# Patient Record
Sex: FEMALE | Race: BLACK OR AFRICAN AMERICAN | NOT HISPANIC OR LATINO | Employment: UNEMPLOYED | URBAN - METROPOLITAN AREA
[De-identification: names, ages, dates, MRNs, and addresses within clinical notes are randomized per-mention and may not be internally consistent; named-entity substitution may affect disease eponyms.]

---

## 2017-09-26 LAB
C TRACH RRNA SPEC QL PROBE: NORMAL
HBV SURFACE AG SERPL QL IA: NEGATIVE
HCT VFR BLD AUTO: 34 % (ref 36–46)
HGB BLD-MCNC: 11 G/DL (ref 12–16)
MCV RBC AUTO: 91 FL (ref 82–108)
PLATELET # BLD AUTO: 185 K/ΜL (ref 150–399)
RPR: NORMAL

## 2017-10-11 DIAGNOSIS — Z36.89 ESTABLISH GESTATIONAL AGE, ULTRASOUND: Primary | ICD-10-CM

## 2017-10-11 DIAGNOSIS — O09.522 ELDERLY MULTIGRAVIDA IN SECOND TRIMESTER: ICD-10-CM

## 2017-10-20 ENCOUNTER — OFFICE VISIT (OUTPATIENT)
Dept: MATERNAL FETAL MEDICINE | Facility: CLINIC | Age: 37
End: 2017-10-20
Payer: MEDICAID

## 2017-10-20 VITALS
BODY MASS INDEX: 28.65 KG/M2 | SYSTOLIC BLOOD PRESSURE: 118 MMHG | WEIGHT: 172.19 LBS | DIASTOLIC BLOOD PRESSURE: 80 MMHG

## 2017-10-20 DIAGNOSIS — Z36.89 ESTABLISH GESTATIONAL AGE, ULTRASOUND: ICD-10-CM

## 2017-10-20 DIAGNOSIS — O09.522 ELDERLY MULTIGRAVIDA IN SECOND TRIMESTER: ICD-10-CM

## 2017-10-20 PROCEDURE — 99203 OFFICE O/P NEW LOW 30 MIN: CPT | Mod: S$PBB,TH,, | Performed by: OBSTETRICS & GYNECOLOGY

## 2017-10-20 PROCEDURE — 99999 PR PBB SHADOW E&M-EST. PATIENT-LVL II: CPT | Mod: PBBFAC,,, | Performed by: OBSTETRICS & GYNECOLOGY

## 2017-10-20 PROCEDURE — 76815 OB US LIMITED FETUS(S): CPT | Mod: PBBFAC | Performed by: OBSTETRICS & GYNECOLOGY

## 2017-10-20 PROCEDURE — 99212 OFFICE O/P EST SF 10 MIN: CPT | Mod: PBBFAC,25 | Performed by: OBSTETRICS & GYNECOLOGY

## 2017-10-20 PROCEDURE — 76815 OB US LIMITED FETUS(S): CPT | Mod: 26,S$PBB,, | Performed by: OBSTETRICS & GYNECOLOGY

## 2017-10-20 NOTE — LETTER
October 21, 2017      Moustapha Wolff, APRN  [address] [facility] [address]           [facility] - Maternal Fetal Med  [address] [facility] [address]  Phone: [phone]          Patient: Britt Roldan   MR Number: [id number]   YOB: 1980   Date of Visit: 10/20/2017       Dear Moustapha Wolff:    Thank you for referring Britt Roldan to me for evaluation. Attached you will find relevant portions of my assessment and plan of care.    If you have questions, please do not hesitate to call me. I look forward to following Britt Roldan along with you.    Sincerely,    Ashli Dumont MD    Enclosure  CC:  No Recipients    If you would like to receive this communication electronically, please contact [email or web address] or [phone] to request more information on CellVir Link access.    For providers and/or their staff who would like to refer a patient to [facility], please contact us through our one-stop-shop provider referral line, [facility], at [phone].    If you feel you have received this communication in error or would no longer like to receive these types of communications, please e-mail [email or web address]

## 2017-10-21 NOTE — PROGRESS NOTES
Indication  ========    Consultation: AMA.    History  ======    General History  Blood group: B  Rhesus: Rh positive  Previous Outcomes  Preg. no. 1  Outcome: Live birth  Gest. age 38 w + 0 d  Gender: male  Details: , c/s, 6ib born in [facility]  Preg. no. 2  Outcome: Live birth  Gest. age 3 w + 0 d  Gender: male  Details:  c/s   3  Para 2  Lundy children born living (T) 1  Lundy children born (T) 1  Lundy living children (L) 2  Risk Factors  History risk factors: AMA  Details: prev c/s x 2    Maternal Assessment  =================    Weight 78 kg  Weight (lb) 172 lb  BP syst 118 mmHg  BP diast 80 mmHg    Method  ======    Transabdominal ultrasound examination. View: Sufficient.    Pregnancy  =========    Lundy pregnancy. Number of fetuses: 1.    Dating  ======    LMP on: 2017  Cycle: regular cycle  GA by LMP 15 w + 2 d  CAROL by LMP: 2018  Ultrasound examination on: 10/20/2017  GA by U/S based upon: AC, BPD, Femur, HC  GA by U/S 15 w + 1 d  CAROL by U/S: 2018  Assigned: Dating performed on 10/20/2017, based on the LMP  Assigned GA 15 w + 2 d  Assigned CAROL: 2018    General Evaluation  ==============    Cardiac activity: present.  bpm.  Fetal movements: visualized.  Presentation: cephalic.  Placenta: posterior.  Umbilical cord: 3 vessel cord.  Amniotic fluid: normal amount.    Fetal Biometry  ============    Fetal Biometry  BPD 29.3 mm 15w 2d Hadlock  OFD 38.5 mm 15w 4d Pamella  .4 mm 15w 2d Hadlock  AC 81.2 mm 14w 3d Hadlock  Femur 18.1 mm 15w 3d Hadlock   g  Calculated by: Hadlock (BPD-HC-AC-FL)  EFW (lb) 0 lb  EFW (oz) 4 oz  Cephalic index 0.76  HC / AC 1.36  FL / BPD 0.62  FL / AC 0.22   bpm    Fetal Anatomy  ===========    Stomach: normal  Kidneys: normal  Bladder: normal  Arms: both visualized  Legs: both visualized  Gender: male  Wants to know gender: yes    Maternal Structures  ===============    Ovaries / Tubes / Adnexa  Rt ovary: Not  visualized  Lt ovary: Not visualized  Pouch of Lupillo / Other Structures  Cul de Sac: Visualized    Consultation  ==========    Type: AMA/dating.  Ms. Roldan is a 36y/o who will be 38 at delivery  at 15 and 2 weeks who presents for consult for dating and AMA.    PMHx: AMA  PSHX: 2 csections  Meds: PNV  NKDA  PObHx: 38 week csection in [place] (6 pounds)-failed pitocin  39 week repeat csection (6 pounds 7ounces)  Family history birth defects: None, no family history sickle cell  Social: Denies smoking, alcohol, illicit drug use    /80  Hgb 11.1, plts 185, MCV 91,hgb solubility negative    A/P:  1. IUP at 15 and 2 weeks    2. Recommend hemoglobin electrophoresis    3. Multiple csections: Risks of adhesions was reviewed. Risks of abnormal placentation were also reviewed.    4. AMA:We discussed the increased risk of chromosomal abnormalities with advanced maternal age. We reviewed the risks, benefits,  alternatives, limitations, and sensitivities of the quad screen and she declined. We discussed the risks, benefits, alternatives, limitations,  sensitivities of NIPT  and she declined. The patient was advised of the risks, benefits, alternatives, and limitations of amniocentesis and the 1 in 1000 risk of  pregnancy related complications including pregnancy loss and declined. We discussed the limitations of ultrasound in detecting  chromosomal  abnormalities such as Trisomy 21. We also reviewed that AMA is associated with an increased risk of adverse pregnancy outcomes such as  diabetes, hypertension, and other adverse outcomes. A detailed fetal anatomic survey is recommended at 19 to 20 weeks gestationa. A growth  ultrasound is recommended at 32 to 34 weeks gestation.    5. Due to lower birthweight of prior child and AMA, recommend baby aspirin 81mg PO daily.    6. Offer HIV testing if this has not been done.    A total of 30 minutes was spent in face to face time with greater than half of that time spent in  counseling and coordination of care.        Impression  =========    Lundy live intrauterine pregnancy.  Biometry agrees with the clinical dating.  Amniotic fluid volume is normal by qualitative assessment.  Limited early fetal anatomy appears normal.  Placenta is posterior. The relationship to the internal os is difficult to assess due to the early gestational age.    Recommendation  ==============    Follow up ultrasound in 4 weeks for detailed fetal anatomic survey due to AMA.  Schedule 32 week growth scan at next visit.

## 2017-11-17 ENCOUNTER — OFFICE VISIT (OUTPATIENT)
Dept: MATERNAL FETAL MEDICINE | Facility: CLINIC | Age: 37
End: 2017-11-17
Attending: OBSTETRICS & GYNECOLOGY
Payer: MEDICAID

## 2017-11-17 DIAGNOSIS — O09.522 ELDERLY MULTIGRAVIDA IN SECOND TRIMESTER: ICD-10-CM

## 2017-11-17 DIAGNOSIS — Z36.89 ENCOUNTER FOR FETAL ANATOMIC SURVEY: Primary | ICD-10-CM

## 2017-11-17 PROCEDURE — 99499 UNLISTED E&M SERVICE: CPT | Mod: S$PBB,,, | Performed by: OBSTETRICS & GYNECOLOGY

## 2017-11-17 PROCEDURE — 76811 OB US DETAILED SNGL FETUS: CPT | Mod: PBBFAC | Performed by: OBSTETRICS & GYNECOLOGY

## 2017-11-17 PROCEDURE — 76811 OB US DETAILED SNGL FETUS: CPT | Mod: 26,S$PBB,, | Performed by: OBSTETRICS & GYNECOLOGY

## 2017-11-17 NOTE — LETTER
November 20, 2017      Moustapha Wolff, APRN  [address] [facility] [address]           [facility] - Maternal Fetal Med  [address] [facility] [address]  Phone: [phone]          Patient: Britt Roldan   MR Number: [id number]   YOB: 1980   Date of Visit: 11/17/2017       Dear Moustapha Wolff:    Thank you for referring Britt Roldan to me for evaluation. Attached you will find relevant portions of my assessment and plan of care.    If you have questions, please do not hesitate to call me. I look forward to following Britt Roldan along with you.    Sincerely,    Grecia Tolbert MD    Enclosure  CC:  No Recipients    If you would like to receive this communication electronically, please contact [email or web address] or [phone] to request more information on FotoSwipe Link access.    For providers and/or their staff who would like to refer a patient to [facility], please contact us through our one-stop-shop provider referral line, [facility] , at [phone].    If you feel you have received this communication in error or would no longer like to receive these types of communications, please e-mail [email or web address]

## 2017-11-20 NOTE — PROGRESS NOTES
Indication  ========    AMA: targeted fetal anatomy survey (Ms. Moustapha Wolff, TRIPP, [facility]).    History  ======    General History  Blood group: B  Rhesus: Rh positive  Other: Ms. Moustapha Wolff, ELISABET, [facility]  AMA: declined screening or genetic procedure  C/S x 2  Previous Outcomes  Preg. no. 1  Outcome: Live birth  Gest. age 38 w + 0 d  Gender: male  Details: , c/s, 6ib born in [facility]  Preg. no. 2  Outcome: Live birth  Gest. age 3 w + 0 d  Gender: male  Details:  c/s   3  Para 2  Lundy children born living (T) 1  Lundy children born (T) 1  Lundy living children (L) 2    Method  ======    Transabdominal ultrasound examination. View: Sufficient.    Pregnancy  =========    Lundy pregnancy. Number of fetuses: 1.    Dating  ======    Cycle: regular cycle  Ultrasound examination on: 2017  GA by U/S based upon: AC, BPD, Femur, HC  GA by U/S 19 w + 0 d  CAROL by U/S: 2018  Assigned: Dating performed on 10/20/2017, based on the LMP  Assigned GA 19 w + 2 d  Assigned CAROL: 2018    General Evaluation  ==============    Cardiac activity: present.  bpm.  Fetal movements: visualized.  Presentation: cephalic.  Placenta: Placental site: posterior, fundal.  Umbilical cord: Cord vessels: 3 vessel cord. Cord insertion: placental insertion: normal.  Amniotic fluid: Amount of AF: normal amount.    Fetal Biometry  ============    Fetal Biometry  BPD 41.7 mm 18w 4d Hadlock  OFD 58.1 mm 20w 2d Pamella  .2 mm 19w 0d Hadlock  .8 mm 19w 0d Hadlock  Femur 29.9 mm 19w 2d Hadlock  Cerebellum tr 18.9 mm 19w 0d Freedman  CM 3.1 mm  Nuchal fold 3.07 mm  Humerus 28.8 mm 19w 2d Pamella   g  Calculated by: Hadlock (BPD-HC-AC-FL)  EFW (lb) 0 lb  EFW (oz) 10 oz  Cephalic index 0.72  HC / AC 1.20  FL / BPD 0.72  FL / AC 0.22   bpm  Head / Face / Neck   7.1 mm    Fetal Anatomy  ============    Cranium: normal  Lateral ventricles: normal  Choroid  plexus: normal  Midline falx: normal  Cavum septi pellucidi: normal  Cerebellum: normal  Cisterna magna: normal  Neck: normal  Nuchal fold: normal  Lips: normal  Profile: normal  Nose: normal  RVOT: suboptimal  LVOT: suboptimal  4-chamber view: 4-chamber normal, septum normal  Situs: normal  Aortic arch: normal  Ductal arch: normal  SVC: normal  IVC: normal  3-vessel view: suboptimal  3-vessel-trachea view: suboptimal  Cardiac axis: normal  Diaphragm: normal  Cord insertion: normal  Stomach: normal  Kidneys: normal  Bladder: normal  Genitals: normal  Cervical spine: normal  Thoracic spine: normal  Lumbar spine: normal  Sacral spine: normal  Arms: normal  Legs: normal  Rt upper arm: normal  Rt forearm: normal  Rt hand: closed  Lt upper arm: normal  Lt forearm: normal  Lt hand: closed  Rt upper leg: normal  Rt lower leg: normal  Rt foot: normal  Lt upper leg: normal  Lt lower leg: normal  Lt foot: normal  Position of feet: normal  Gender: male  Wants to know gender: yes    Maternal Structures  ===============    Uterus / Cervix  Cervical length 45.6 mm    Consultation  ==========    FUV: targeted fetal anatomical survey    36y/o  at 19w 2d gestation    AMA:  -prev counseling with Dr. Dumont on 10-  -declined genetic screening or genetic procedure    PMHx: AMA  PSHX: C/S x 2  Meds: PNV  NKDA  PObHx: 38 wk, C/S in [place] (6 lbs)-failed pitocin  39 wk, repeat C/S (6 lbs 7oz)  Family history birth defects: None, no family history sickle cell  Social: Denies smoking, alcohol, illicit drug use    HIV negative (2017).    Impression  =========    1. 19w 2d anguiano intrauterine pregnancy  2. Fetal biometry c/w dates  3. No fetal structural abnormalities appreciated but incomplete for above mentioned organs secondary to fetal position  4. Amniotic fluid volume wnl per qualitative assessment  5. Fundal/Posterior placenta and no previa appreciated  6. Cervical length screen via TAS wnl  .    Recommendation  ==============    1. We will schedule patient for a f/u sono in 4-6 wks to complete fetal anatomical survey, interval fetal growth    2. AMA:  Recommendations from our MFM group at [facility]:  -For women who are of advanced maternal age at the time of delivery we recommend a follow up fetal ultrasound at 32-34 weeks for fetal growth  assessment.  -We recommend initiation of low dose asprin 81 mg daily after 12 wks gestation to reduce the risk of pre-eclampsia, IUGR and stillbirth.    3. See Dr. Dumont previous sono/consult on 10-:  -she recommended a hemoglobin electrophoresis: if performed, please faxv results to [facility] for review  .

## 2017-12-15 ENCOUNTER — OFFICE VISIT (OUTPATIENT)
Dept: MATERNAL FETAL MEDICINE | Facility: CLINIC | Age: 37
End: 2017-12-15
Payer: MEDICAID

## 2017-12-15 DIAGNOSIS — Z36.89 ENCOUNTER FOR ULTRASOUND TO CHECK FETAL GROWTH: ICD-10-CM

## 2017-12-15 DIAGNOSIS — O09.522 ELDERLY MULTIGRAVIDA IN SECOND TRIMESTER: ICD-10-CM

## 2017-12-15 PROCEDURE — 99499 UNLISTED E&M SERVICE: CPT | Mod: S$PBB,,, | Performed by: OBSTETRICS & GYNECOLOGY

## 2017-12-15 PROCEDURE — 76816 OB US FOLLOW-UP PER FETUS: CPT | Mod: PBBFAC | Performed by: OBSTETRICS & GYNECOLOGY

## 2017-12-15 PROCEDURE — 76816 OB US FOLLOW-UP PER FETUS: CPT | Mod: 26,S$PBB,, | Performed by: OBSTETRICS & GYNECOLOGY

## 2017-12-15 NOTE — LETTER
December 15, 2017      Moustapha Wolff, APRN  [address] [facility] [address]           [facility] - Maternal Fetal Med  [address] [facility] [address]  Phone: [phone]          Patient: Britt Roldan   MR Number: [id number]   YOB: 1980   Date of Visit: 12/15/2017       Dear Moustapha Wolff:    Thank you for referring Britt Roldan to me for evaluation. Attached you will find relevant portions of my assessment and plan of care.    If you have questions, please do not hesitate to call me. I look forward to following Britt Roldan along with you.    Sincerely,    Ashli Dumont MD    Enclosure  CC:  No Recipients    If you would like to receive this communication electronically, please contact [email or web address] or [phone] to request more information on [email or web address] Link access.    For providers and/or their staff who would like to refer a patient to [facility], please contact us through our one-stop-shop provider referral line, [facility], at [phone].    If you feel you have received this communication in error or would no longer like to receive these types of communications, please e-mail [email or web address]

## 2017-12-15 NOTE — PROGRESS NOTES
Indication  ========    Follow-up evaluation of anatomy. Follow-up evaluation for fetal growth.    History  ======    General History  Blood group: B  Rhesus: Rh positive  Other: Ms. Gerard Age, [facility], [facility]  AMA: declined screening or genetic procedure  C/S x 2  Previous Outcomes  Preg. no. 1  Outcome: Live birth  Gest. age 38 w + 0 d  Gender: male  Details: , c/s, 6ib born in [place]  Preg. no. 2  Outcome: Live birth  Gest. age 3 w + 0 d  Gender: male  Details:  c/s   3  Para 2  Lundy children born living (T) 1  Lundy children born (T) 1  Lundy living children (L) 2    Method  ======    Transabdominal ultrasound examination. View: Sufficient.    Pregnancy  =========    Lundy pregnancy. Number of fetuses: 1.    Dating  ======    Cycle: regular cycle  Ultrasound examination on: 12/15/2017  GA by U/S based upon: AC, BPD, Femur, HC  GA by U/S 23 w + 0 d  CAROL by U/S: 2018  Assigned: Dating performed on 10/20/2017, based on the LMP  Assigned GA 23 w + 2 d  Assigned CAROL: 2018    General Evaluation  ==============    Cardiac activity:  bpm.  Fetal movements: visualized.  Presentation: cephalic.  Placenta: posterior.  Umbilical cord: 3 vessel cord.  Amniotic fluid: normal amount.    Fetal Biometry  ============    Fetal Biometry  BPD 53.4 mm 22w 2d Hadlock  OFD 74.6 mm 24w 5d Pamella  .5 mm 22w 5d Hadlock  .6 mm 23w 3d Hadlock  Femur 41.3 mm 23w 3d Hadlock   g 41% Greyson  Calculated by: Hadlock (BPD-HC-AC-FL)  EFW (lb) 1 lb  EFW (oz) 5 oz  Cephalic index 0.72  HC / AC 1.11  FL / BPD 0.77  FL / AC 0.22   bpm  Head / Face / Neck   4.5 mm    Fetal Anatomy  ===========    Cranium: normal  4-chamber view: documented previously  RVOT: normal  LVOT: normal  3-vessel view: normal  3-vessel-trachea view: normal  Stomach: normal  Kidneys: normal  Bladder: normal  Gender: male  Wants to know gender: yes  Other: A full anatomic survey has been  previously performed.    Impression  =========    Lundy live intrauterine pregnancy.  Overall normal fetal growth.  Normal amniotic fluid volume.  The previously suboptimally visualized fetal anatomy was seen today and appeared normal. Limited fetal anatomy appears normal.    Recommendation  ==============    Follow up ultrasound for growth in 9 weeks due to AMA and previous children with low birthweights.

## 2018-01-03 LAB — GLUCOSE SERPL-MCNC: 84 MG/DL

## 2018-02-14 ENCOUNTER — OFFICE VISIT (OUTPATIENT)
Dept: MATERNAL FETAL MEDICINE | Facility: CLINIC | Age: 38
End: 2018-02-14
Payer: MEDICAID

## 2018-02-14 DIAGNOSIS — O09.523 ELDERLY MULTIGRAVIDA IN THIRD TRIMESTER: ICD-10-CM

## 2018-02-14 DIAGNOSIS — O09.522 ELDERLY MULTIGRAVIDA IN SECOND TRIMESTER: ICD-10-CM

## 2018-02-14 PROCEDURE — 99499 UNLISTED E&M SERVICE: CPT | Mod: S$PBB,,, | Performed by: OBSTETRICS & GYNECOLOGY

## 2018-02-14 PROCEDURE — 76816 OB US FOLLOW-UP PER FETUS: CPT | Mod: PBBFAC | Performed by: OBSTETRICS & GYNECOLOGY

## 2018-02-14 PROCEDURE — 76816 OB US FOLLOW-UP PER FETUS: CPT | Mod: 26,S$PBB,, | Performed by: OBSTETRICS & GYNECOLOGY

## 2018-02-14 NOTE — PROGRESS NOTES
OB Ultrasound Report (see PDF version under imaging tab)    Indication  ========    Evaluation of fetal growth / AMA .    History  ======    General History  Blood group: B  Rhesus: Rh positive  Other: Ms. Gerard Age, [facility], [facility]  AMA: declined screening or genetic procedure  C/S x 2  Previous Outcomes  Preg. no. 1  Outcome: Live birth  Gest. age 38 w + 0 d  Gender: male  Details: , c/s, 6ib born in [place]  Preg. no. 2  Outcome: Live birth  Gest. age 3 w + 0 d  Gender: male  Details:  c/s   3  Para 2  Lundy children born living (T) 1  Lundy children born (T) 1  Lundy living children (L) 2    Method  ======    Transabdominal ultrasound examination. View: Sufficient.    Pregnancy  =========    Lundy pregnancy. Number of fetuses: 1.    Dating  ======    Cycle: regular cycle  Ultrasound examination on: 2018  GA by U/S based upon: AC, BPD, Femur, HC  GA by U/S 31 w + 4 d  CAROL by U/S: 2018  Assigned: Dating performed on 10/20/2017, based on the LMP  Assigned GA 32 w + 0 d  Assigned CAROL: 2018    General Evaluation  ===============    Cardiac activity: present.  bpm.  Fetal movements: visualized.  Presentation: cephalic.  Placenta: posterior.  Umbilical cord: 3 vessel cord.  Amniotic fluid: MVP 4.9 cm.    Fetal Biometry  ===========    Fetal Biometry  BPD 77.5 mm 31w 1d Hadlock  .7 mm 34w 6d Pamella  .0 mm 32w 2d Hadlock  .0 mm 31w 0d Hadlock  Femur 61.1 mm 31w 5d Hadlock  EFW 1,752 g 21% Greyson  Calculated by: Hadlock (BPD-HC-AC-FL)  EFW (lb) 3 lb  EFW (oz) 14 oz  Cephalic index 0.73  HC / AC 1.09  FL / BPD 0.79  FL / AC 0.23  MVP 4.9 cm   bpm  Head / Face / Neck   3.2 mm    Fetal Anatomy  ===========    Cranium: normal  Lateral ventricles: normal  Stomach: normal  Kidneys: normal  Bladder: normal  Genitals: documented previously  Gender: male  Wants to know gender: yes  Other: A full anatomy survey previously  performed.    Impression  =========    Fetal size is AGA with the EFW at the 21st percentile.  Normal repeat limited fetal anatomic survey. AFV is normal. Follow-up ultrasound as clinically indicated.

## 2018-02-22 ENCOUNTER — INITIAL PRENATAL (OUTPATIENT)
Dept: OBSTETRICS AND GYNECOLOGY | Facility: CLINIC | Age: 38
End: 2018-02-22
Payer: MEDICAID

## 2018-02-22 VITALS — WEIGHT: 196 LBS | BODY MASS INDEX: 32.61 KG/M2 | DIASTOLIC BLOOD PRESSURE: 80 MMHG | SYSTOLIC BLOOD PRESSURE: 134 MMHG

## 2018-02-22 DIAGNOSIS — Z3A.33 33 WEEKS GESTATION OF PREGNANCY: Primary | ICD-10-CM

## 2018-02-22 DIAGNOSIS — Z98.891 HISTORY OF 2 CESAREAN SECTIONS: ICD-10-CM

## 2018-02-22 DIAGNOSIS — Z34.03 ENCOUNTER FOR SUPERVISION OF NORMAL FIRST PREGNANCY IN THIRD TRIMESTER: ICD-10-CM

## 2018-02-22 PROCEDURE — 3008F BODY MASS INDEX DOCD: CPT | Mod: ,,, | Performed by: STUDENT IN AN ORGANIZED HEALTH CARE EDUCATION/TRAINING PROGRAM

## 2018-02-22 PROCEDURE — 99999 PR PBB SHADOW E&M-EST. PATIENT-LVL II: CPT | Mod: PBBFAC,,, | Performed by: STUDENT IN AN ORGANIZED HEALTH CARE EDUCATION/TRAINING PROGRAM

## 2018-02-22 PROCEDURE — 99212 OFFICE O/P EST SF 10 MIN: CPT | Mod: PBBFAC,TH,PO | Performed by: STUDENT IN AN ORGANIZED HEALTH CARE EDUCATION/TRAINING PROGRAM

## 2018-02-22 PROCEDURE — 99213 OFFICE O/P EST LOW 20 MIN: CPT | Mod: TH,S$PBB,, | Performed by: STUDENT IN AN ORGANIZED HEALTH CARE EDUCATION/TRAINING PROGRAM

## 2018-02-22 NOTE — PROGRESS NOTES
Seen and examined.  Agree with note.  All questions answered.  Contraceptive counseling performed - does not want to do anything permanent like BTL. Desires OCPs 6wk PP.

## 2018-02-22 NOTE — PROGRESS NOTES
Complaints today:none. + FM, no vaginal bleeding, discharge, ctx    /80   Wt 88.9 kg (195 lb 15.8 oz)   LMP 2017 (LMP Unknown)   BMI 32.61 kg/m²     38 y.o., at 33w1d by Estimated Date of Delivery: 18  Patient Active Problem List   Diagnosis    33 weeks gestation of pregnancy     OB History    Para Term  AB Living   3 2 2 0 0 2   SAB TAB Ectopic Multiple Live Births   0 0 0 0 2      # Outcome Date GA Lbr Jeferson/2nd Weight Sex Delivery Anes PTL Lv   3 Current            2 Term  39w0d  2.92 kg (6 lb 7 oz) M CS-LTranv  N MIHAELA      Birth Comments: System Generated. Please review and update pregnancy details.   1 Term 11 39w5d  3.147 kg (6 lb 15 oz) M CS-LTranv EPI  MIHAELA      Birth Comments: FTP      Obstetric Comments   She denies current pregnancy. She denies breast feeding.       Dating reviewed    Allergies and problem list reviewed and updated    Medical and surgical history reviewed    Prenatal labs reviewed and updated    Physical Exam:  ABD: soft, gravid, nontender, fundal height at 32 cm    Assessment:  3rd trimester pregnancy    Plan:   Hx of C/S  - Plan repeat at 39 weeks  - Undecided ab tubal ligation  - follow up 2 Weeks, bleeding/pain precautions kick counts, labor precautions counseled  - Will need HIV, RPR re ordered

## 2018-02-27 ENCOUNTER — LAB VISIT (OUTPATIENT)
Dept: LAB | Facility: OTHER | Age: 38
End: 2018-02-27
Payer: MEDICAID

## 2018-02-27 DIAGNOSIS — Z3A.33 33 WEEKS GESTATION OF PREGNANCY: ICD-10-CM

## 2018-02-27 LAB
BASOPHILS # BLD AUTO: 0.01 K/UL
BASOPHILS NFR BLD: 0.1 %
DIFFERENTIAL METHOD: ABNORMAL
EOSINOPHIL # BLD AUTO: 0.1 K/UL
EOSINOPHIL NFR BLD: 1.1 %
ERYTHROCYTE [DISTWIDTH] IN BLOOD BY AUTOMATED COUNT: 13.3 %
HCT VFR BLD AUTO: 34.9 %
HGB BLD-MCNC: 11.7 G/DL
HIV 1+2 AB+HIV1 P24 AG SERPL QL IA: NEGATIVE
LYMPHOCYTES # BLD AUTO: 1.8 K/UL
LYMPHOCYTES NFR BLD: 25.2 %
MCH RBC QN AUTO: 31 PG
MCHC RBC AUTO-ENTMCNC: 33.5 G/DL
MCV RBC AUTO: 93 FL
MONOCYTES # BLD AUTO: 0.9 K/UL
MONOCYTES NFR BLD: 12.7 %
NEUTROPHILS # BLD AUTO: 4.2 K/UL
NEUTROPHILS NFR BLD: 59.8 %
PLATELET # BLD AUTO: 180 K/UL
PMV BLD AUTO: 12 FL
RBC # BLD AUTO: 3.77 M/UL
WBC # BLD AUTO: 7.09 K/UL

## 2018-02-27 PROCEDURE — 86592 SYPHILIS TEST NON-TREP QUAL: CPT

## 2018-02-27 PROCEDURE — 85025 COMPLETE CBC W/AUTO DIFF WBC: CPT

## 2018-02-27 PROCEDURE — 86703 HIV-1/HIV-2 1 RESULT ANTBDY: CPT

## 2018-02-27 PROCEDURE — 36415 COLL VENOUS BLD VENIPUNCTURE: CPT

## 2018-02-28 LAB — RPR SER QL: NORMAL

## 2018-03-08 ENCOUNTER — ROUTINE PRENATAL (OUTPATIENT)
Dept: OBSTETRICS AND GYNECOLOGY | Facility: CLINIC | Age: 38
End: 2018-03-08
Payer: MEDICAID

## 2018-03-08 VITALS — WEIGHT: 201.5 LBS | BODY MASS INDEX: 33.53 KG/M2 | SYSTOLIC BLOOD PRESSURE: 124 MMHG | DIASTOLIC BLOOD PRESSURE: 70 MMHG

## 2018-03-08 DIAGNOSIS — Z34.83 ENCOUNTER FOR SUPERVISION OF OTHER NORMAL PREGNANCY IN THIRD TRIMESTER: Primary | ICD-10-CM

## 2018-03-08 PROCEDURE — 99213 OFFICE O/P EST LOW 20 MIN: CPT | Mod: PBBFAC,TH,PO | Performed by: NURSE PRACTITIONER

## 2018-03-08 PROCEDURE — 99999 PR PBB SHADOW E&M-EST. PATIENT-LVL III: CPT | Mod: PBBFAC,,, | Performed by: NURSE PRACTITIONER

## 2018-03-08 PROCEDURE — 87147 CULTURE TYPE IMMUNOLOGIC: CPT

## 2018-03-08 PROCEDURE — 87184 SC STD DISK METHOD PER PLATE: CPT

## 2018-03-08 PROCEDURE — 87081 CULTURE SCREEN ONLY: CPT

## 2018-03-08 PROCEDURE — 99212 OFFICE O/P EST SF 10 MIN: CPT | Mod: TH,,, | Performed by: NURSE PRACTITIONER

## 2018-03-08 NOTE — PROGRESS NOTES
Complaints today: none. Denies ctx, VB, LOF. + FM.     /80   Wt 88.9 kg (195 lb 15.8 oz)   LMP 2017 (LMP Unknown)   BMI 32.61 kg/m²      38 y.o., at 33w1d by Estimated Date of Delivery: 18      Patient Active Problem List   Diagnosis    33 weeks gestation of pregnancy                       OB History    Para Term  AB Living   3 2 2 0 0 2   SAB TAB Ectopic Multiple Live Births   0 0 0 0 2       # Outcome Date GA Lbr Jeferson/2nd Weight Sex Delivery Anes PTL Lv   3 Current                     2 Term  39w0d   2.92 kg (6 lb 7 oz) M CS-LTranv   N MIHAELA      Birth Comments: System Generated. Please review and update pregnancy details.   1 Term 11 39w5d   3.147 kg (6 lb 15 oz) M CS-LTranv EPI   MIHAELA      Birth Comments: FTP       Obstetric Comments   She denies current pregnancy. She denies breast feeding.         Dating reviewed     Allergies and problem list reviewed and updated     Medical and surgical history reviewed     Prenatal labs reviewed and updated     Physical Exam:  ABD: soft, gravid, nontender     Assessment:  3rd trimester pregnancy     Plan:   Hx of C/S  - Plan repeat at 39 weeks, will schedule within the next couple of clinic visits  - Desires OCP for contraception postpartum  - GBS today  - F/u in 1 week

## 2018-03-14 ENCOUNTER — ROUTINE PRENATAL (OUTPATIENT)
Dept: OBSTETRICS AND GYNECOLOGY | Facility: CLINIC | Age: 38
End: 2018-03-14
Payer: MEDICAID

## 2018-03-14 VITALS
DIASTOLIC BLOOD PRESSURE: 80 MMHG | BODY MASS INDEX: 33.68 KG/M2 | SYSTOLIC BLOOD PRESSURE: 126 MMHG | WEIGHT: 202.38 LBS

## 2018-03-14 DIAGNOSIS — Z3A.36 36 WEEKS GESTATION OF PREGNANCY: Primary | ICD-10-CM

## 2018-03-14 PROBLEM — Z3A.33 33 WEEKS GESTATION OF PREGNANCY: Status: RESOLVED | Noted: 2018-02-22 | Resolved: 2018-03-14

## 2018-03-14 PROCEDURE — 99213 OFFICE O/P EST LOW 20 MIN: CPT | Mod: TH,S$PBB,, | Performed by: NURSE PRACTITIONER

## 2018-03-14 PROCEDURE — 99999 PR PBB SHADOW E&M-EST. PATIENT-LVL II: CPT | Mod: PBBFAC,,, | Performed by: NURSE PRACTITIONER

## 2018-03-14 PROCEDURE — 99212 OFFICE O/P EST SF 10 MIN: CPT | Mod: PBBFAC,PO | Performed by: NURSE PRACTITIONER

## 2018-03-14 NOTE — PATIENT INSTRUCTIONS
FETAL KICK COUNTS  You are the best monitor for how well your baby is doing. The American Congress of Obstetricians and Gynecologists (ACOG) recommends that you time how long it takes you to feel 10 kicks, flutters, swishes, or rolls. Ideally, you want to feel at least 10 movements within 2 hours. You will likely feel 10 movements in less time than that. Please start counting your babys movements twice a day using the following guidelines:  Fetal Kick Counts:  1. Count in the morning to make sure baby moves 5 times within one hour  2. Count in the evening to make sure baby moves 5 times within on hour  NOTE: count movements of any kind: kicks, clutters, swishes, rolls  If at any time you feel the baby is not moving as much as he/she usually does please follow the below:  1. Have something to eat/drink  2. Lie down on your left side in a quiet room with you hand on uterus  3. Count the movements your baby makes in the next hour  4. If you are not able to feel 5 movements in the hour notify provider   As baby grows and you get closer to your due date, the movements will change. With less room inside of your uterus your baby becomes less vigorous. Continue to monitor movements as outlined above.  Do Not compare your babys movements to other pregnant women or previous pregnancies. Each baby has an individual pattern of activity and development. R  Remember you know your aleisha better than anyone else. If there is sudden changes in the movement of your baby, notify provider immediately.

## 2018-03-14 NOTE — PROGRESS NOTES
Chief Complaint: Third Trimester Obstetric Visit (29 wks-Delivery)    HPI: Britt Roldan is a 38 y.o., , at 36w0d wks here for a routine prenatal visit. She denies any vaginal bleeding, leaking fluids, abnormal vaginal discharge. Edema not reported by patient. Florence Gabriel contractions not reported by patient. Fetal movement detected at this time.     Physical Exam:   FHT: 140s  FH: 36  /80   Wt 91.8 kg (202 lb 6.1 oz)   LMP 2017 (LMP Unknown)   BMI 33.68 kg/m²     Assessment/Plan  1. Third Trimester Pregnancy Routine Visit--patient here for routine prenatal visit doing well. Continue daily prenatal vitamin, second trimester ACOG education topics discussed and handout provided.  2. Baby Friendly/Breastfeeding Education-- Pre- Checklist for 33-36: Classes/Tours, Breastfeeding, Labs, GBS, Kick, Quiet Time, Breastfeeding review: benefits of breastfeeding, early skin to skin,  rooming in,   3. Circ: Yes  4. GBS screening--screening discussed and positive, waiting on final report  5. Infant Feeding--discussed breastfeeding with patient and pt plans on BF    6. Abhi LAWSON: Patient has Dr. LYNN in another city and will use madeleine bhagat in hospital   7. Fetal Movement--discussed and handout provided to patient on monitoring fetal movement  8. Post-partum Contraception Method--discussed options with patient and patient desires POPs  9. PTL--discussed with patient s/s of PTL and real vs false labor and when to report to L&D or call  10. Post-Partum Depression--Discussed s/s of PPD, baby blues and when to notify the office and/or emergency contacts    RTC prn as schedule with OBMD

## 2018-03-15 PROBLEM — O99.820 GBS (GROUP B STREPTOCOCCUS CARRIER), +RV CULTURE, CURRENTLY PREGNANT: Status: ACTIVE | Noted: 2018-03-15

## 2018-03-15 LAB — BACTERIA SPEC AEROBE CULT: NORMAL

## 2018-03-21 ENCOUNTER — ROUTINE PRENATAL (OUTPATIENT)
Dept: OBSTETRICS AND GYNECOLOGY | Facility: CLINIC | Age: 38
End: 2018-03-21
Payer: MEDICAID

## 2018-03-21 VITALS — SYSTOLIC BLOOD PRESSURE: 120 MMHG | WEIGHT: 201.94 LBS | BODY MASS INDEX: 33.6 KG/M2 | DIASTOLIC BLOOD PRESSURE: 80 MMHG

## 2018-03-21 DIAGNOSIS — O09.523 ELDERLY MULTIGRAVIDA IN THIRD TRIMESTER: Primary | ICD-10-CM

## 2018-03-21 DIAGNOSIS — O34.211 MATERNAL CARE DUE TO LOW TRANSVERSE UTERINE SCAR FROM PREVIOUS CESAREAN DELIVERY: ICD-10-CM

## 2018-03-21 PROBLEM — O09.529 ANTEPARTUM MULTIGRAVIDA OF ADVANCED MATERNAL AGE: Status: ACTIVE | Noted: 2018-03-21

## 2018-03-21 PROBLEM — Z34.80 SUPERVISION OF OTHER NORMAL PREGNANCY, ANTEPARTUM: Status: ACTIVE | Noted: 2018-03-21

## 2018-03-21 PROCEDURE — 99999 PR PBB SHADOW E&M-EST. PATIENT-LVL II: CPT | Mod: PBBFAC,,, | Performed by: STUDENT IN AN ORGANIZED HEALTH CARE EDUCATION/TRAINING PROGRAM

## 2018-03-21 PROCEDURE — 99212 OFFICE O/P EST SF 10 MIN: CPT | Mod: PBBFAC,TH,PO | Performed by: STUDENT IN AN ORGANIZED HEALTH CARE EDUCATION/TRAINING PROGRAM

## 2018-03-21 PROCEDURE — 99213 OFFICE O/P EST LOW 20 MIN: CPT | Mod: TH,S$PBB,, | Performed by: STUDENT IN AN ORGANIZED HEALTH CARE EDUCATION/TRAINING PROGRAM

## 2018-03-21 NOTE — PROGRESS NOTES
Chief Complaint   Patient presents with    Routine Prenatal Visit       38 y.o., at 37w0d by Estimated Date of Delivery: 18    Complaints today: None    ROS  OBSTETRICS:   Contractions N   Bleeding N   Loss of fluid N   Fetal mvmnt Y  GASTRO:   Nausea N   Vomiting N      OB History    Para Term  AB Living   3 2 2 0 0 2   SAB TAB Ectopic Multiple Live Births   0 0 0 0 2      # Outcome Date GA Lbr Jeferson/2nd Weight Sex Delivery Anes PTL Lv   3 Current            2 Term  39w0d  2.92 kg (6 lb 7 oz) M CS-LTranv  N MIHAELA      Birth Comments: System Generated. Please review and update pregnancy details.   1 Term 11 39w5d  3.147 kg (6 lb 15 oz) M CS-LTranv EPI  MIHAELA      Birth Comments: FTP      Obstetric Comments   She denies current pregnancy. She denies breast feeding.       Dating reviewed  Allergies and problem list reviewed and updated  Medical and surgical history reviewed  Prenatal labs reviewed and updated    PHYSICAL EXAM  /80   Wt 91.6 kg (201 lb 15.1 oz)   LMP 2017 (LMP Unknown)   BMI 33.60 kg/m²     GENERAL: No acute distress  NEURO: Alert and oriented x3  PSYCH: Normal mood and affect  PULMONARY: Non-labored respiration  ABDomen: Soft, gravid, nontender    ASSESSMENT AND PLAN    G3 Problems (from 10/20/17 to present)     Problem Noted Resolved    Antepartum multigravida of advanced maternal age 3/21/2018 by MARY Patricio MD No    Overview Signed 3/21/2018  9:12 AM by MARY Patricio MD     Dating - By LMP consistent with MFM u/s at 15 weeks  U/S - Normal anatomy.  Aneuploidy screening - Declines  Vaccines - Declines Tdap.  Contraception - OCPs  Pap - 2017: NILM.           Maternal care due to low transverse uterine scar from previous  delivery 3/21/2018 by MARY Patricio MD No    Overview Signed 3/21/2018  9:13 AM by MARY Patricio MD     1st C/S - In [facility].  2nd C/S - OP note reviewed: LTCS without vertical extension.    Plan for repeat C/S  at 39 weeks.         GBS (group B Streptococcus carrier), +RV culture, currently pregnant 3/15/2018 by Giselle Mcmahon MD No          Doing well.  PNL - Up to date.  C/S - Plan repeat c/s at 39 weeks.  Will scheduled on RTC.  Patient desires 4/6/2018 if possible.  Vaccines - Declines Tdap.    Labor precautions given  Follow-up: 1 week

## 2018-03-21 NOTE — LETTER
March 21, 2018    Britt Roldan  [address] [place] [id number]              [facility]  [address] [facility] [place] [address]  Phone: [phone]    To Whom It May Concern:    Ms. Roldan is currently under our care for pregnancy.  Estimated Date of Delivery: 4/11/2018    Patient will be starting her maternity leave as of 3/21/2018. If you have any question or concerns you can contact the office.        Sincerely,    Laura Patricio MD

## 2018-03-28 ENCOUNTER — ROUTINE PRENATAL (OUTPATIENT)
Dept: OBSTETRICS AND GYNECOLOGY | Facility: CLINIC | Age: 38
End: 2018-03-28
Payer: MEDICAID

## 2018-03-28 VITALS
DIASTOLIC BLOOD PRESSURE: 64 MMHG | WEIGHT: 200.38 LBS | BODY MASS INDEX: 33.35 KG/M2 | SYSTOLIC BLOOD PRESSURE: 108 MMHG

## 2018-03-28 DIAGNOSIS — O34.211 MATERNAL CARE DUE TO LOW TRANSVERSE UTERINE SCAR FROM PREVIOUS CESAREAN DELIVERY: ICD-10-CM

## 2018-03-28 DIAGNOSIS — O09.523 ELDERLY MULTIGRAVIDA IN THIRD TRIMESTER: Primary | ICD-10-CM

## 2018-03-28 PROCEDURE — 99999 PR PBB SHADOW E&M-EST. PATIENT-LVL II: CPT | Mod: PBBFAC,,, | Performed by: STUDENT IN AN ORGANIZED HEALTH CARE EDUCATION/TRAINING PROGRAM

## 2018-03-28 PROCEDURE — 99213 OFFICE O/P EST LOW 20 MIN: CPT | Mod: TH,S$PBB,, | Performed by: STUDENT IN AN ORGANIZED HEALTH CARE EDUCATION/TRAINING PROGRAM

## 2018-03-28 PROCEDURE — 99212 OFFICE O/P EST SF 10 MIN: CPT | Mod: PBBFAC,TH,PO | Performed by: STUDENT IN AN ORGANIZED HEALTH CARE EDUCATION/TRAINING PROGRAM

## 2018-03-28 NOTE — PROGRESS NOTES
Complaints today: None. Good FM. No VB, LOF, ctx.       /64   Wt 90.9 kg (200 lb 6.4 oz)   LMP 2017 (LMP Unknown)   BMI 33.35 kg/m²     38 y.o., at 38w0d by Estimated Date of Delivery: 18  Patient Active Problem List   Diagnosis    GBS (group B Streptococcus carrier), +RV culture, currently pregnant    Antepartum multigravida of advanced maternal age    Maternal care due to low transverse uterine scar from previous  delivery     OB History    Para Term  AB Living   3 2 2 0 0 2   SAB TAB Ectopic Multiple Live Births   0 0 0 0 2      # Outcome Date GA Lbr Jeferson/2nd Weight Sex Delivery Anes PTL Lv   3 Current            2 Term  39w0d  2.92 kg (6 lb 7 oz) M CS-LTranv  N MIHAELA      Birth Comments: System Generated. Please review and update pregnancy details.   1 Term 11 39w5d  3.147 kg (6 lb 15 oz) M CS-LTranv EPI  MIHAELA      Birth Comments: FTP      Obstetric Comments   She denies current pregnancy. She denies breast feeding.       Dating reviewed    Allergies and problem list reviewed and updated    Medical and surgical history reviewed    Prenatal labs reviewed and updated    Physical Exam:  ABD: soft, gravid, nontender    Assessment:  Routine prenatal    Plan:   Plan for repeat LTCS on 18 at 1000  Wants OCPs postpartum     follow up 1 Weeks, bleeding/pain precautions,  kick counts, labor precautions given

## 2018-04-06 ENCOUNTER — SURGERY (OUTPATIENT)
Age: 38
End: 2018-04-06

## 2018-04-06 ENCOUNTER — ANESTHESIA EVENT (OUTPATIENT)
Dept: OBSTETRICS AND GYNECOLOGY | Facility: OTHER | Age: 38
End: 2018-04-06
Payer: MEDICAID

## 2018-04-06 ENCOUNTER — ANESTHESIA (OUTPATIENT)
Dept: OBSTETRICS AND GYNECOLOGY | Facility: OTHER | Age: 38
End: 2018-04-06
Payer: MEDICAID

## 2018-04-06 ENCOUNTER — ANESTHESIA (OUTPATIENT)
Dept: OBSTETRICS AND GYNECOLOGY | Facility: OTHER | Age: 38
End: 2018-04-06

## 2018-04-06 ENCOUNTER — HOSPITAL ENCOUNTER (INPATIENT)
Facility: OTHER | Age: 38
LOS: 3 days | Discharge: HOME | End: 2018-04-09
Attending: OBSTETRICS & GYNECOLOGY | Admitting: OBSTETRICS & GYNECOLOGY
Payer: MEDICAID

## 2018-04-06 ENCOUNTER — ANESTHESIA EVENT (OUTPATIENT)
Dept: OBSTETRICS AND GYNECOLOGY | Facility: OTHER | Age: 38
End: 2018-04-06

## 2018-04-06 DIAGNOSIS — Z98.891 S/P REPEAT LOW TRANSVERSE C-SECTION: Primary | ICD-10-CM

## 2018-04-06 DIAGNOSIS — O34.211 MATERNAL CARE DUE TO LOW TRANSVERSE UTERINE SCAR FROM PREVIOUS CESAREAN DELIVERY: ICD-10-CM

## 2018-04-06 DIAGNOSIS — O34.219 PREVIOUS CESAREAN DELIVERY AFFECTING PREGNANCY: ICD-10-CM

## 2018-04-06 DIAGNOSIS — O99.820 GBS (GROUP B STREPTOCOCCUS CARRIER), +RV CULTURE, CURRENTLY PREGNANT: ICD-10-CM

## 2018-04-06 DIAGNOSIS — Z3A.39 39 WEEKS GESTATION OF PREGNANCY: ICD-10-CM

## 2018-04-06 LAB
ABO + RH BLD: NORMAL
BASOPHILS # BLD AUTO: 0.01 K/UL
BASOPHILS NFR BLD: 0.1 %
BLD GP AB SCN CELLS X3 SERPL QL: NORMAL
DIFFERENTIAL METHOD: ABNORMAL
EOSINOPHIL # BLD AUTO: 0 K/UL
EOSINOPHIL NFR BLD: 0.4 %
ERYTHROCYTE [DISTWIDTH] IN BLOOD BY AUTOMATED COUNT: 13.4 %
HCT VFR BLD AUTO: 33.3 %
HGB BLD-MCNC: 11 G/DL
LYMPHOCYTES # BLD AUTO: 1.9 K/UL
LYMPHOCYTES NFR BLD: 26.3 %
MCH RBC QN AUTO: 30.5 PG
MCHC RBC AUTO-ENTMCNC: 33 G/DL
MCV RBC AUTO: 92 FL
MONOCYTES # BLD AUTO: 0.6 K/UL
MONOCYTES NFR BLD: 8 %
NEUTROPHILS # BLD AUTO: 4.6 K/UL
NEUTROPHILS NFR BLD: 64.8 %
PLATELET # BLD AUTO: 144 K/UL
PMV BLD AUTO: 11.3 FL
RBC # BLD AUTO: 3.61 M/UL
WBC # BLD AUTO: 7.1 K/UL

## 2018-04-06 PROCEDURE — 11000001 HC ACUTE MED/SURG PRIVATE ROOM

## 2018-04-06 PROCEDURE — 85025 COMPLETE CBC W/AUTO DIFF WBC: CPT

## 2018-04-06 PROCEDURE — 63600175 PHARM REV CODE 636 W HCPCS: Performed by: STUDENT IN AN ORGANIZED HEALTH CARE EDUCATION/TRAINING PROGRAM

## 2018-04-06 PROCEDURE — 25000003 PHARM REV CODE 250: Performed by: OBSTETRICS & GYNECOLOGY

## 2018-04-06 PROCEDURE — 36000685 HC CESAREAN SECTION LEVEL I

## 2018-04-06 PROCEDURE — 25000003 PHARM REV CODE 250

## 2018-04-06 PROCEDURE — 86762 RUBELLA ANTIBODY: CPT

## 2018-04-06 PROCEDURE — 25000003 PHARM REV CODE 250: Performed by: STUDENT IN AN ORGANIZED HEALTH CARE EDUCATION/TRAINING PROGRAM

## 2018-04-06 PROCEDURE — 0502F SUBSEQUENT PRENATAL CARE: CPT | Mod: ,,, | Performed by: OBSTETRICS & GYNECOLOGY

## 2018-04-06 PROCEDURE — 51702 INSERT TEMP BLADDER CATH: CPT

## 2018-04-06 PROCEDURE — 59514 CESAREAN DELIVERY ONLY: CPT | Mod: GB,,, | Performed by: OBSTETRICS & GYNECOLOGY

## 2018-04-06 PROCEDURE — 37000009 HC ANESTHESIA EA ADD 15 MINS: Performed by: OBSTETRICS & GYNECOLOGY

## 2018-04-06 PROCEDURE — S0028 INJECTION, FAMOTIDINE, 20 MG: HCPCS

## 2018-04-06 PROCEDURE — S0020 INJECTION, BUPIVICAINE HYDRO: HCPCS | Performed by: STUDENT IN AN ORGANIZED HEALTH CARE EDUCATION/TRAINING PROGRAM

## 2018-04-06 PROCEDURE — 59514 CESAREAN DELIVERY ONLY: CPT | Mod: ,,, | Performed by: ANESTHESIOLOGY

## 2018-04-06 PROCEDURE — 37000008 HC ANESTHESIA 1ST 15 MINUTES: Performed by: OBSTETRICS & GYNECOLOGY

## 2018-04-06 PROCEDURE — 86850 RBC ANTIBODY SCREEN: CPT

## 2018-04-06 RX ORDER — SIMETHICONE 80 MG
1 TABLET,CHEWABLE ORAL EVERY 6 HOURS PRN
Status: DISCONTINUED | OUTPATIENT
Start: 2018-04-06 | End: 2018-04-09 | Stop reason: HOSPADM

## 2018-04-06 RX ORDER — ACETAMINOPHEN 325 MG/1
650 TABLET ORAL EVERY 6 HOURS
Status: DISPENSED | OUTPATIENT
Start: 2018-04-06 | End: 2018-04-07

## 2018-04-06 RX ORDER — OXYCODONE HYDROCHLORIDE 5 MG/1
5 TABLET ORAL EVERY 4 HOURS PRN
Status: DISCONTINUED | OUTPATIENT
Start: 2018-04-06 | End: 2018-04-09 | Stop reason: HOSPADM

## 2018-04-06 RX ORDER — KETOROLAC TROMETHAMINE 30 MG/ML
30 INJECTION, SOLUTION INTRAMUSCULAR; INTRAVENOUS EVERY 6 HOURS
Status: COMPLETED | OUTPATIENT
Start: 2018-04-06 | End: 2018-04-07

## 2018-04-06 RX ORDER — AMOXICILLIN 250 MG
1 CAPSULE ORAL NIGHTLY PRN
Status: DISCONTINUED | OUTPATIENT
Start: 2018-04-06 | End: 2018-04-09 | Stop reason: HOSPADM

## 2018-04-06 RX ORDER — OXYTOCIN 10 [USP'U]/ML
INJECTION, SOLUTION INTRAMUSCULAR; INTRAVENOUS
Status: DISCONTINUED | OUTPATIENT
Start: 2018-04-06 | End: 2018-04-06

## 2018-04-06 RX ORDER — DIPHENHYDRAMINE HCL 25 MG
25 CAPSULE ORAL EVERY 4 HOURS PRN
Status: DISCONTINUED | OUTPATIENT
Start: 2018-04-06 | End: 2018-04-09 | Stop reason: HOSPADM

## 2018-04-06 RX ORDER — ONDANSETRON 8 MG/1
8 TABLET, ORALLY DISINTEGRATING ORAL EVERY 8 HOURS PRN
Status: DISCONTINUED | OUTPATIENT
Start: 2018-04-06 | End: 2018-04-09 | Stop reason: HOSPADM

## 2018-04-06 RX ORDER — HYDROCORTISONE 25 MG/G
CREAM TOPICAL 3 TIMES DAILY PRN
Status: DISCONTINUED | OUTPATIENT
Start: 2018-04-06 | End: 2018-04-09 | Stop reason: HOSPADM

## 2018-04-06 RX ORDER — ONDANSETRON 2 MG/ML
4 INJECTION INTRAMUSCULAR; INTRAVENOUS EVERY 6 HOURS PRN
Status: DISCONTINUED | OUTPATIENT
Start: 2018-04-06 | End: 2018-04-09 | Stop reason: HOSPADM

## 2018-04-06 RX ORDER — PHENYLEPHRINE HYDROCHLORIDE 10 MG/ML
INJECTION INTRAVENOUS
Status: DISCONTINUED | OUTPATIENT
Start: 2018-04-06 | End: 2018-04-06

## 2018-04-06 RX ORDER — CEFAZOLIN SODIUM 1 G/3ML
2 INJECTION, POWDER, FOR SOLUTION INTRAMUSCULAR; INTRAVENOUS
Status: DISCONTINUED | OUTPATIENT
Start: 2018-04-06 | End: 2018-04-06

## 2018-04-06 RX ORDER — MORPHINE SULFATE 0.5 MG/ML
INJECTION, SOLUTION EPIDURAL; INTRATHECAL; INTRAVENOUS
Status: DISCONTINUED | OUTPATIENT
Start: 2018-04-06 | End: 2018-04-06

## 2018-04-06 RX ORDER — BUPIVACAINE HYDROCHLORIDE 7.5 MG/ML
INJECTION, SOLUTION EPIDURAL; RETROBULBAR
Status: DISCONTINUED | OUTPATIENT
Start: 2018-04-06 | End: 2018-04-06

## 2018-04-06 RX ORDER — SODIUM CHLORIDE, SODIUM LACTATE, POTASSIUM CHLORIDE, CALCIUM CHLORIDE 600; 310; 30; 20 MG/100ML; MG/100ML; MG/100ML; MG/100ML
INJECTION, SOLUTION INTRAVENOUS CONTINUOUS
Status: DISCONTINUED | OUTPATIENT
Start: 2018-04-06 | End: 2018-04-06

## 2018-04-06 RX ORDER — ADHESIVE BANDAGE
30 BANDAGE TOPICAL 2 TIMES DAILY PRN
Status: DISCONTINUED | OUTPATIENT
Start: 2018-04-07 | End: 2018-04-09 | Stop reason: HOSPADM

## 2018-04-06 RX ORDER — SODIUM CITRATE AND CITRIC ACID MONOHYDRATE 334; 500 MG/5ML; MG/5ML
SOLUTION ORAL
Status: COMPLETED
Start: 2018-04-06 | End: 2018-04-06

## 2018-04-06 RX ORDER — FAMOTIDINE 10 MG/ML
20 INJECTION INTRAVENOUS
Status: DISCONTINUED | OUTPATIENT
Start: 2018-04-06 | End: 2018-04-06

## 2018-04-06 RX ORDER — SODIUM CHLORIDE, SODIUM LACTATE, POTASSIUM CHLORIDE, CALCIUM CHLORIDE 600; 310; 30; 20 MG/100ML; MG/100ML; MG/100ML; MG/100ML
INJECTION, SOLUTION INTRAVENOUS CONTINUOUS
Status: ACTIVE | OUTPATIENT
Start: 2018-04-06 | End: 2018-04-07

## 2018-04-06 RX ORDER — DOCUSATE SODIUM 100 MG/1
200 CAPSULE, LIQUID FILLED ORAL 2 TIMES DAILY
Status: DISCONTINUED | OUTPATIENT
Start: 2018-04-06 | End: 2018-04-09 | Stop reason: HOSPADM

## 2018-04-06 RX ORDER — OXYTOCIN/RINGER'S LACTATE 20/1000 ML
41.65 PLASTIC BAG, INJECTION (ML) INTRAVENOUS CONTINUOUS
Status: ACTIVE | OUTPATIENT
Start: 2018-04-06 | End: 2018-04-06

## 2018-04-06 RX ORDER — BISACODYL 10 MG
10 SUPPOSITORY, RECTAL RECTAL ONCE AS NEEDED
Status: ACTIVE | OUTPATIENT
Start: 2018-04-06 | End: 2018-04-06

## 2018-04-06 RX ORDER — OXYCODONE HYDROCHLORIDE 5 MG/1
10 TABLET ORAL EVERY 4 HOURS PRN
Status: DISCONTINUED | OUTPATIENT
Start: 2018-04-06 | End: 2018-04-09 | Stop reason: HOSPADM

## 2018-04-06 RX ORDER — KETOROLAC TROMETHAMINE 30 MG/ML
INJECTION, SOLUTION INTRAMUSCULAR; INTRAVENOUS
Status: DISCONTINUED | OUTPATIENT
Start: 2018-04-06 | End: 2018-04-06

## 2018-04-06 RX ORDER — SODIUM CHLORIDE, SODIUM LACTATE, POTASSIUM CHLORIDE, CALCIUM CHLORIDE 600; 310; 30; 20 MG/100ML; MG/100ML; MG/100ML; MG/100ML
INJECTION, SOLUTION INTRAVENOUS CONTINUOUS PRN
Status: DISCONTINUED | OUTPATIENT
Start: 2018-04-06 | End: 2018-04-06

## 2018-04-06 RX ORDER — ACETAMINOPHEN 10 MG/ML
INJECTION, SOLUTION INTRAVENOUS
Status: DISCONTINUED | OUTPATIENT
Start: 2018-04-06 | End: 2018-04-06

## 2018-04-06 RX ORDER — FENTANYL CITRATE 50 UG/ML
INJECTION, SOLUTION INTRAMUSCULAR; INTRAVENOUS
Status: DISCONTINUED | OUTPATIENT
Start: 2018-04-06 | End: 2018-04-06

## 2018-04-06 RX ORDER — HYDROCODONE BITARTRATE AND ACETAMINOPHEN 10; 325 MG/1; MG/1
1 TABLET ORAL EVERY 4 HOURS PRN
Status: DISCONTINUED | OUTPATIENT
Start: 2018-04-07 | End: 2018-04-09 | Stop reason: HOSPADM

## 2018-04-06 RX ORDER — MUPIROCIN 20 MG/G
1 OINTMENT TOPICAL 2 TIMES DAILY
Status: DISCONTINUED | OUTPATIENT
Start: 2018-04-06 | End: 2018-04-09 | Stop reason: HOSPADM

## 2018-04-06 RX ORDER — MUPIROCIN 20 MG/G
OINTMENT TOPICAL
Status: DISCONTINUED | OUTPATIENT
Start: 2018-04-06 | End: 2018-04-06

## 2018-04-06 RX ORDER — SODIUM CITRATE AND CITRIC ACID MONOHYDRATE 334; 500 MG/5ML; MG/5ML
30 SOLUTION ORAL ONCE
Status: COMPLETED | OUTPATIENT
Start: 2018-04-06 | End: 2018-04-06

## 2018-04-06 RX ORDER — FAMOTIDINE 10 MG/ML
INJECTION INTRAVENOUS
Status: COMPLETED
Start: 2018-04-06 | End: 2018-04-06

## 2018-04-06 RX ORDER — HYDROCODONE BITARTRATE AND ACETAMINOPHEN 5; 325 MG/1; MG/1
1 TABLET ORAL EVERY 4 HOURS PRN
Status: DISCONTINUED | OUTPATIENT
Start: 2018-04-07 | End: 2018-04-09 | Stop reason: HOSPADM

## 2018-04-06 RX ORDER — ONDANSETRON HYDROCHLORIDE 2 MG/ML
INJECTION, SOLUTION INTRAMUSCULAR; INTRAVENOUS
Status: DISCONTINUED | OUTPATIENT
Start: 2018-04-06 | End: 2018-04-06

## 2018-04-06 RX ORDER — MORPHINE SULFATE 0.5 MG/ML
INJECTION, SOLUTION EPIDURAL; INTRATHECAL; INTRAVENOUS
Status: COMPLETED
Start: 2018-04-06 | End: 2018-04-06

## 2018-04-06 RX ORDER — IBUPROFEN 600 MG/1
600 TABLET ORAL EVERY 6 HOURS
Status: DISCONTINUED | OUTPATIENT
Start: 2018-04-07 | End: 2018-04-09 | Stop reason: HOSPADM

## 2018-04-06 RX ADMIN — ACETAMINOPHEN 1000 MG: 10 INJECTION, SOLUTION INTRAVENOUS at 10:04

## 2018-04-06 RX ADMIN — OXYTOCIN 3 UNITS: 10 INJECTION, SOLUTION INTRAMUSCULAR; INTRAVENOUS at 10:04

## 2018-04-06 RX ADMIN — ACETAMINOPHEN 650 MG: 325 TABLET ORAL at 11:04

## 2018-04-06 RX ADMIN — DOCUSATE SODIUM 200 MG: 100 CAPSULE, LIQUID FILLED ORAL at 08:04

## 2018-04-06 RX ADMIN — PHENYLEPHRINE HYDROCHLORIDE 100 MCG: 10 INJECTION INTRAVENOUS at 10:04

## 2018-04-06 RX ADMIN — ACETAMINOPHEN 650 MG: 325 TABLET ORAL at 04:04

## 2018-04-06 RX ADMIN — SODIUM CHLORIDE, SODIUM LACTATE, POTASSIUM CHLORIDE, AND CALCIUM CHLORIDE: 600; 310; 30; 20 INJECTION, SOLUTION INTRAVENOUS at 10:04

## 2018-04-06 RX ADMIN — ONDANSETRON 4 MG: 2 INJECTION, SOLUTION INTRAMUSCULAR; INTRAVENOUS at 10:04

## 2018-04-06 RX ADMIN — DIPHENHYDRAMINE HYDROCHLORIDE 25 MG: 25 CAPSULE ORAL at 04:04

## 2018-04-06 RX ADMIN — Medication 0.15 MG: at 09:04

## 2018-04-06 RX ADMIN — SODIUM CHLORIDE, SODIUM LACTATE, POTASSIUM CHLORIDE, AND CALCIUM CHLORIDE 1000 ML: .6; .31; .03; .02 INJECTION, SOLUTION INTRAVENOUS at 08:04

## 2018-04-06 RX ADMIN — SODIUM CITRATE AND CITRIC ACID MONOHYDRATE 30 ML: 500; 334 SOLUTION ORAL at 09:04

## 2018-04-06 RX ADMIN — BUPIVACAINE HYDROCHLORIDE 1.6 MG: 7.5 INJECTION, SOLUTION EPIDURAL; RETROBULBAR at 09:04

## 2018-04-06 RX ADMIN — FAMOTIDINE 20 MG: 10 INJECTION INTRAVENOUS at 09:04

## 2018-04-06 RX ADMIN — SODIUM CITRATE AND CITRIC ACID MONOHYDRATE 30 ML: 334; 500 SOLUTION ORAL at 09:04

## 2018-04-06 RX ADMIN — FENTANYL CITRATE 25 MCG: 50 INJECTION, SOLUTION INTRAMUSCULAR; INTRAVENOUS at 10:04

## 2018-04-06 RX ADMIN — OXYCODONE HYDROCHLORIDE 10 MG: 5 TABLET ORAL at 12:04

## 2018-04-06 RX ADMIN — OXYTOCIN 2 UNITS: 10 INJECTION, SOLUTION INTRAMUSCULAR; INTRAVENOUS at 10:04

## 2018-04-06 RX ADMIN — KETOROLAC TROMETHAMINE 30 MG: 30 INJECTION, SOLUTION INTRAMUSCULAR; INTRAVENOUS at 10:04

## 2018-04-06 RX ADMIN — SODIUM CHLORIDE, SODIUM LACTATE, POTASSIUM CHLORIDE, AND CALCIUM CHLORIDE: 600; 310; 30; 20 INJECTION, SOLUTION INTRAVENOUS at 09:04

## 2018-04-06 RX ADMIN — KETOROLAC TROMETHAMINE 30 MG: 30 INJECTION, SOLUTION INTRAMUSCULAR at 11:04

## 2018-04-06 RX ADMIN — OXYCODONE HYDROCHLORIDE 10 MG: 5 TABLET ORAL at 08:04

## 2018-04-06 RX ADMIN — DEXTROSE 2 G: 50 INJECTION, SOLUTION INTRAVENOUS at 10:04

## 2018-04-06 RX ADMIN — KETOROLAC TROMETHAMINE 30 MG: 30 INJECTION, SOLUTION INTRAMUSCULAR at 04:04

## 2018-04-06 RX ADMIN — FENTANYL CITRATE 10 MCG: 50 INJECTION, SOLUTION INTRAMUSCULAR; INTRAVENOUS at 09:04

## 2018-04-06 NOTE — H&P
[facility]  Obstetrics  History & Physical    Patient Name: Britt Roldan  MRN: [id number]  Admission Date: 2018  Primary Care Provider: Moustapha SNOW Age, APRN    Subjective:     Principal Problem:Previous  delivery affecting pregnancy    History of Present Illness:   Britt Roldan is a 38 y.o.  female with IUP at 39w2d weeks gestation who is admitted for scheduled  Section secondary to prior c/s x2.     This IUP is complicated by GBS+ status, prior c/s x2.  Patient denies contractions, denies vaginal bleeding, denies LOF.   Fetal Movement: normal.         Obstetric History       T2      L2     SAB0   TAB0   Ectopic0   Multiple0   Live Births2       # Outcome Date GA Lbr Jeferson/2nd Weight Sex Delivery Anes PTL Lv   3 Current            2 Term  39w0d  2.92 kg (6 lb 7 oz) M CS-LTranv  N MIHAELA   1 Term 11 39w5d  3.147 kg (6 lb 15 oz) M CS-LTranv EPI  MIHAELA      Name: Eddi      Obstetric Comments   She denies current pregnancy. She denies breast feeding.     Past Medical History:   Diagnosis Date    Back injury      Past Surgical History:   Procedure Laterality Date    None         PTA Medications   Medication Sig    PRENATAL VIT CALC,IRON,FOLIC (PRENATAL VITAMIN ORAL) Take 1 tablet by mouth Daily.       Review of patient's allergies indicates:  No Known Allergies     Family History     Problem Relation (Age of Onset)    Hypertension         Social History Main Topics    Smoking status: Never Smoker    Smokeless tobacco: Never Used    Alcohol use No    Drug use: No    Sexual activity: Yes     Partners: Male     Review of Systems   Constitutional: Negative.  Negative for chills and fever.   Eyes: Negative.    Respiratory: Negative for shortness of breath.    Cardiovascular: Negative for chest pain.   Gastrointestinal: Negative for abdominal pain, bloating and constipation.   Endocrine: Negative.    Genitourinary: Negative for dyspareunia, frequency, menstrual  problem, pelvic pain and vaginal bleeding.   Musculoskeletal: Negative for back pain.   Skin:  Negative.   Neurological: Negative for headaches.   Hematological: Negative.    Psychiatric/Behavioral: Negative.    Breast: Negative for breast mass and breast pain     Objective:     Vital Signs (Most Recent):  Temp: 97.1 °F (36.2 °C) (18 0759)  Pulse: 75 (18 0759)  Resp: 20 (18 0759)  BP: 122/70 (18 0759) Vital Signs (24h Range):  Temp:  [97.1 °F (36.2 °C)] 97.1 °F (36.2 °C)  Pulse:  [75] 75  Resp:  [20] 20  BP: (122)/(70) 122/70     Weight: 90.7 kg (200 lb)  Body mass index is 33.28 kg/m².    FHT: Cat 1 (reassuring)  TOCO:  Q 10 minutes    Physical Exam:   Constitutional: She is oriented to person, place, and time. She appears well-developed and well-nourished. No distress.    HENT:   Head: Atraumatic.    Eyes: EOM are normal. Pupils are equal, round, and reactive to light.    Neck: Normal range of motion. Neck supple.    Cardiovascular: Normal rate and regular rhythm.     Pulmonary/Chest: Effort normal. No respiratory distress. She has no wheezes.        Abdominal: Soft. She exhibits no distension and no abdominal incision. There is no tenderness.             Musculoskeletal: Normal range of motion. She exhibits no edema or tenderness.       Neurological: She is alert and oriented to person, place, and time. She has normal reflexes.    Skin: Skin is warm and dry. She is not diaphoretic.        Cervix: def     Significant Labs:  Lab Results   Component Value Date    GROUPTRH B POS 2012    HEPBSAG Negative 2017    STREPBCULT  2018     STREPTOCOCCUS AGALACTIAE (GROUP B)  Beta-hemolytic streptococci are routinely susceptible to   penicillins,cephalosporins and carbapenems.         I have personallly reviewed all pertinent lab results from the last 24 hours.    Assessment/Plan:     38 y.o. female  at 39w2d for:    * Previous  delivery affecting pregnancy    -  Consents signed and to chart  - Admit to Labor and Delivery unit  - Epidural per Anesthesia  - Draw CBC, T&S  - Ancef OCTOR  - To OR for C/S. Case Request is in.   - Notify Staff  - Ultrasound performed, infant in vertex position.   - Post-Partum Hemorrhage risk - low            Anjel Celis MD  Obstetrics  [facility]

## 2018-04-06 NOTE — DISCHARGE INSTRUCTIONS
Breastfeeding Discharge Instructions       Feed the baby at the earliest sign of hunger or comfort  o Hands to mouth, sucking motions  o Rooting or searching for something to suck on  o Dont wait for crying - it is a sign of distress     The feedings may be 8-12 times per 24hrs and will not follow a schedule   Avoid pacifiers and bottles for the first 4 weeks   Alternate the breast you start the feeding with, or start with the breast that feels the fullest   Switch breasts when the baby takes himself off the breast or falls asleep   Keep offering breasts until the baby looks full, no longer gives hunger signs, and stays asleep when placed on his back in the crib   If the baby is sleepy and wont wake for a feeding, put the baby skin-to-skin dressed in a diaper against the mothers bare chest   Sleep near your baby   The baby should be positioned and latched on to the breast correctly  o Chest-to-chest, chin in the breast  o Babys lips are flipped outward  o Babys mouth is stretched open wide like a shout  o Babys sucking should feel like tugging to the mother  - The baby should be drinking at the breast:  o You should hear swallowing or gulping throughout the feeding  o You should see milk on the babys lips when he comes off the breast  o Your breasts should be softer when the baby is finished feeding  o The baby should look relaxed at the end of feedings  o After the 4th day and your milk is in:  o The babys poop should turn bright yellow and be loose, watery, and seedy  o The baby should have at least 3-4 poops the size of the palm of your hand per day  o The baby should have at least 5-6 wet diapers per day  o The urine should be light yellow in color  You should drink when you are thirsty and eat a healthy diet when you are    hungry.     Take naps to get the rest you need.   Take medications and/or drink alcohol only with permission of your obstetrician    or the babys pediatrician.  You can  also call the [facility]ant Risk Center,   ([phone]), Monday-Friday, 8am-5pm Central time, to get the most   up-to-date evidence-based information on the use of medications during   pregnancy and breastfeeding.      The baby should be examined by a pediatrician at 3-5 days of age.  Once your   milk comes in, the baby should be gaining at least ½ - 1oz each day and should be back to birthweight no later than 10-14 days of age.          Community Resources    [facility] Breastfeeding Warmline: [phone]   Local [facility] clinics: provide incentives and breastpumps to eligible mothers  [employer] (LLLI):  mother-to-mother support group website        www.Wannado.Duetto  Local La Leche League mother-to-mother support groups:        www.Waffl.com        La Leche League [facility]   Dr. Andrea Parekh website for latch videos and general information:        www.breastfeedinginc.ca  [facility] Risk [facility] is a call center that provides information about the safety of taking medications while breastfeeding.  Call 1-420.992.1087, M-F, 8am-5pm, CT.  International Lactation Consultant Association provides resources for assistance:        www.ilca.org  [facility] Breastfeeding Coalition provides informationand resources for parents  and the community    http://[facility] Hospitalastfeeding.org     Nehal Balderas is a mom-to-mom support group:                             www.[employer].com//breastfeedng-support/  [employer] for Healthy Babies:  4-227-605-BABY(3685)  [employer]: a breastfeeding support group for women of color, [phone]

## 2018-04-06 NOTE — ANESTHESIA RELEASE NOTE
"Anesthesia Release from PACU Note    Patient: Britt Roldna    Procedure(s) Performed: Procedure(s) (LRB):  DELIVERY- SECTION (N/A)    Anesthesia type: CSE    Post pain: Adequate analgesia    Post assessment: no apparent anesthetic complications    Last Vitals:   Visit Vitals  /71   Pulse (!) 58   Temp 36 °C (96.8 °F)   Resp 18   Ht 5' 5" (1.651 m)   Wt 90.7 kg (200 lb)   LMP 2017 (LMP Unknown)   SpO2 100%   Breastfeeding? Unknown   BMI 33.28 kg/m²       Post vital signs: stable    Level of consciousness: awake    Nausea/Vomiting: no nausea/no vomiting    Complications: none    Airway Patency: patent    Respiratory: unassisted    Cardiovascular: stable and blood pressure at baseline    Hydration: euvolemic  "

## 2018-04-06 NOTE — ANESTHESIA PREPROCEDURE EVALUATION
Britt Roldan is a 38 y.o.  39w2d who presents for scheduled  section. Patient reports no significant past medical history. She states that she had her first  [place] 2[place]2 failure to progress. [place] second csection in [place] was 2/2 not knowing the circumstances surrounding her first . She reports an uncomplicated pregnancy. She denies prescription medication or herbal supplement use. She denies a history of bleeding/clotting disorders, asthma, or HTN.     OB History    Para Term  AB Living   3 2 2 0 0 2   SAB TAB Ectopic Multiple Live Births   0 0 0 0 2      # Outcome Date GA Lbr Jeferson/2nd Weight Sex Delivery Anes PTL Lv   3 Current            2 Term  39w0d  2.92 kg (6 lb 7 oz) M CS-LTranv  N MIHAELA      Birth Comments: System Generated. Please review and update pregnancy details.   1 Term 11 39w5d  3.147 kg (6 lb 15 oz) M CS-LTranv EPI  MIHAELA      Birth Comments: FTP      Obstetric Comments   She denies current pregnancy. She denies breast feeding.       Wt Readings from Last 1 Encounters:   18 0801 90.7 kg (200 lb)       BP Readings from Last 3 Encounters:   18 122/70   18 108/64   18 120/80       Patient Active Problem List   Diagnosis    GBS (group B Streptococcus carrier), +RV culture, currently pregnant    Antepartum multigravida of advanced maternal age    Maternal care due to low transverse uterine scar from previous  delivery    Previous  delivery affecting pregnancy       Past Surgical History:   Procedure Laterality Date    None         Social History     Social History    Marital status:      Spouse name: N/A    Number of children: N/A    Years of education: N/A     Occupational History    Not on file.     Social History Main Topics    Smoking status: Never Smoker    Smokeless tobacco: Never Used    Alcohol use No    Drug use: No    Sexual activity: Yes     Partners: Male     Other  Topics Concern    Not on file     Social History Narrative    No narrative on file         Chemistry    No results found for: NA, K, CL, CO2, BUN, CREATININE, GLU No results found for: CALCIUM, ALKPHOS, AST, ALT, BILITOT, ESTGFRAFRICA, EGFRNONAA         Lab Results   Component Value Date    WBC 7.10 04/06/2018    HGB 11.0 (L) 04/06/2018    HCT 33.3 (L) 04/06/2018    MCV 92 04/06/2018     (L) 04/06/2018       No results for input(s): PT, INR, PROTIME, APTT in the last 72 hours.                Anesthesia Evaluation    I have reviewed the Patient Summary Reports.    I have reviewed the Nursing Notes.      Review of Systems  Anesthesia Hx:  History of prior surgery of interest to airway management or planning: Previous anesthesia: Epidural, Spinal Denies Family Hx of Anesthesia complications.   Denies Personal Hx of Anesthesia complications.   Hematology/Oncology:  Hematology Normal   Oncology Normal     EENT/Dental:EENT/Dental Normal   Cardiovascular:  Cardiovascular Normal     Pulmonary:  Pulmonary Normal    Renal/:  Renal/ Normal     Hepatic/GI:  Hepatic/GI Normal    Musculoskeletal:  Musculoskeletal Normal    Neurological:  Neurology Normal    Endocrine:  Endocrine Normal    Dermatological:  Skin Normal    Psych:  Psychiatric Normal           Physical Exam  General:  Well nourished    Airway/Jaw/Neck:  Airway Findings: Mouth Opening: Normal Tongue: Normal  General Airway Assessment: Adult  Mallampati: II  TM Distance: Normal, at least 6 cm  Jaw/Neck Findings:  Neck ROM: Normal ROM  Neck Findings: Normal    Eyes/Ears/Nose:  EYES/EARS/NOSE FINDINGS: Normal   Dental:  Dental Findings: In tact, Periodontal disease, Mild   Chest/Lungs:  Chest/Lungs Findings: Clear to auscultation, Normal Respiratory Rate     Heart/Vascular:  Heart Findings: Rate: Normal  Rhythm: Regular Rhythm  Sounds: Normal  Heart murmur: negative Vascular Findings: Normal    Abdomen:  Abdomen Findings: Normal     Musculoskeletal:  Musculoskeletal Findings: Normal   Skin:  Skin Findings: Normal    Mental Status:  Mental Status Findings:  Cooperative, Alert and Oriented         Anesthesia Plan  Type of Anesthesia, risks & benefits discussed:  Anesthesia Type:  general, spinal, epidural, CSE  Patient's Preference:   Intra-op Monitoring Plan: standard ASA monitors  Intra-op Monitoring Plan Comments:   Post Op Pain Control Plan: multimodal analgesia and per primary service following discharge from PACU  Post Op Pain Control Plan Comments:   Induction:    Beta Blocker:  Patient is not currently on a Beta-Blocker (No further documentation required).       Informed Consent: Patient understands risks and agrees with Anesthesia plan.  Questions answered. Anesthesia consent signed with patient.  ASA Score: 2     Day of Surgery Review of History & Physical:    H&P update referred to the surgeon.     Anesthesia Plan Notes: CSE        Ready For Surgery From Anesthesia Perspective.

## 2018-04-06 NOTE — TRANSFER OF CARE
"Anesthesia Transfer of Care Note    Patient: Britt Roldan    Procedure(s) Performed: Procedure(s) (LRB):  DELIVERY- SECTION (N/A)    Patient location: Labor and Delivery    Anesthesia Type: CSE    Transport from OR: Transported from OR on room air with adequate spontaneous ventilation    Post pain: adequate analgesia    Post assessment: no apparent anesthetic complications    Post vital signs: stable    Level of consciousness: awake and alert    Nausea/Vomiting: no nausea/vomiting    Complications: none    Transfer of care protocol was followed      Last vitals:   Visit Vitals  /70   Pulse 75   Temp 36.2 °C (97.1 °F) (Oral)   Resp 20   Ht 5' 5" (1.651 m)   Wt 90.7 kg (200 lb)   LMP 2017 (LMP Unknown)   Breastfeeding? No   BMI 33.28 kg/m²     "

## 2018-04-06 NOTE — L&D DELIVERY NOTE
Section Operative Note  Procedure Date: 2018    Procedure: Repeat low  Section via Pfannenstiel skin incision    Indications: Prior c/s x2    Pre-operative Diagnosis:   1. IUP at 39 week 2 day pregnancy  2. Prior c/s x2  3. GBS+ status    Post-operative Diagnosis: same    Surgeon: Sugar Kirby MD     Assistants: Anjel Celis MD - Resident    Anesthesia: Epidural anesthesia    Findings:   1. Normal appearing fallopian tubes and ovaries. Small serosal fibroid on posterior uterus.  2. Normal placenta.  3. Moderate amount of scar tissue encountered during dissection to uterus.     Estimated Blood Loss:  760 mL           Total IV Fluids: 1000 mL     UOP: 150 mL    Specimens:   1. Single viable male infant   2. Placenta which was discarded    PreOp CBC:   Lab Results   Component Value Date    WBC 7.85 2018    HGB 7.6 (L) 2018    HCT 22.8 (L) 2018    MCV 92 2018     2018                     Complications:  None; patient tolerated the procedure well.           Disposition: PACU - hemodynamically stable.           Condition: stable    Procedure Details   The patient was seen in the Holding Room. The risks, benefits, complications, treatment options, and expected outcomes were discussed with the patient.  The patient concurred with the proposed plan, giving informed consent.  The site of surgery properly noted/marked. The patient was taken to Operating Room, identified as Britt Roldan and the procedure verified as  Delivery. A Time Out was held and the above information confirmed.    After induction of anesthesia, the patient was prepped and draped in the usual sterile manner while placed in a dorsal supine position with a left lateral tilt.  A mcghee catheter was also placed per nursing. Preoperative antibiotics were administered and an allis test was performed yielding adequate anesthesia.  A Pfannenstiel incision was made and carried down through the  subcutaneous tissue to the fascia. Fascial incision was made and extended transversely. The fascia was grasped with Kocher clamps and  from the underlying rectus tissue superiorly and inferiorly. The peritoneum was identified, found to be free of adherent bowel and entered sharply. Peritoneal incision was extended longitudinally. The vesico-uterine peritoneum was identified and bladder blade was inserted to keep the bladder out of the operative field. A low transverse uterine incision was made with knife and extended with finger fracture. The amniotic sac was ruptured with hemostats and the infant was noted to be in cephalic position. The fetal head was brought to the incision and elevated out of the pelvis. The patient delivered a single viable male infant. Infant weighed 3110 grams with Apgar scores of 8/9 at one and five minutes respectively. After the umbilical cord was clamped and cut, cord blood was obtained for evaluation. The placenta was removed intact and appeared normal, and was discarded. The uterus was exteriorized. The uterine outline, tubes and ovaries appeared as above. The uterine incision was closed with running locked sutures of #1 chromic with one figure of eight suture for hemostasis. Hemostasis was observed. The uterus was returned to the abdominal cavity. Incision was reinspected and good hemostasis was noted. The abdominal cavity was irrigated to remove clots. The muscle was reapproximated with mattress and single interrupted 2-0 vicryl. The fascia was then reapproximated with running sutures of 1 PDS. The skin was reapproximated with 4-0 monocryl.    Instrument, sponge, and needle counts were correct prior the abdominal closure and at the conclusion of the case.     Pt tolerated procedure well and was in good condition after the procedure.    **NOTE: This patient IS NOT a candidate for trial of labor after  delivery.**    Delivery Information for  Vick Baez  information:  YOB: 2018   Time of birth: 10:19 AM   Sex: male   Head Delivery Date/Time: 2018 10:18 AM   Delivery type: , Low Transverse   Gestational Age: 39w2d    Delivery Providers    Delivering clinician:  Sugar Kirby DO   Provider Role    Waleska Marie, RN Registered Nurse    Ashli De León RN Registered Nurse    Anjel Celis MD Resident    MADISON Macedo MD Resident             Measurements    Weight:  3110 g  Length:  48.3 cm  Head circumference:  35.6 cm  Chest circumference:  34.3 cm          Assessment    Living status:  Living  Apgars:     1 Minute:   5 Minute:   10 Minute:   15 Minute:   20 Minute:     Skin Color:   0  1       Heart Rate:   2  2       Reflex Irritability:   2  2       Muscle Tone:   2  2       Respiratory Effort:   2  2       Total:   8  9               Apgars Assigned By:  CONSTANTINO CROUCH         Assisted Delivery Details:    Forceps attempted?:  No  Vacuum extractor attempted?:  No         Shoulder Dystocia    Shoulder dystocia present?:  No           Presentation and Position    Presentation:  Vertex           Interventions/Resuscitation    Method:  Bulb Suctioning, Tactile Stimulation, PPV       Cord    Vessels:  3 vessels  Complications:  None  Delayed Cord Clamping?:  No  Cord Clamped Date/Time:  2018 10:19 AM  Cord Blood Disposition:  Sent with Baby  Gases Sent?:  No  Stem Cell Collection (by MD):  No       Placenta    Date and time:  2018 10:20 AM  Removal:  Manual removal  Appearance:  Intact  Placenta disposition:  discarded           Labor Events:       labor:       Labor Onset Date/Time:         Dilation Complete Date/Time:         Start Pushing Date/Time:       Rupture Date/Time:              Rupture type:           Fluid Amount:        Fluid Color:        Fluid Odor:        Membrane Status (PeriCalm):        Rupture Date/Time (PeriCalm):        Fluid Amount (PeriCalm):        Fluid Color  (PeriCalm):         steroids:       Antibiotics given for GBS:       Induction:       Indications for induction:        Augmentation:       Indications for augmentation:       Labor complications: None     Additional complications:          Cervical ripening:                     Delivery:      Episiotomy: None     Indication for Episiotomy:       Perineal Lacerations:   Repaired:      Periurethral Laceration:   Repaired:     Labial Laceration:   Repaired:     Sulcus Laceration:   Repaired:     Vaginal Laceration:   Repaired:     Cervical Laceration:   Repaired:     Repair suture:       Repair # of packets: 7     Vaginal delivery QBL (mL): 0      QBL (mL): 765     Combined Blood Loss (mL): 765     Vaginal Sweep Performed:       Surgicount Correct: Yes       Other providers:       Anesthesia    Method:  Spinal, Epidural          Details (if applicable):  Trial of Labor No    Categorization: Repeat    Priority: Routine   Indications for : Repeat Section   Incision Type: low transverse     Additional  information:  Forceps:    Vacuum:    Breech:    Observed anomalies    Other (Comments):         Sea Celis MD  PGY-2 OB/GYN  [phone]

## 2018-04-06 NOTE — ASSESSMENT & PLAN NOTE
- Consents signed and to chart  - Admit to Labor and Delivery unit  - Epidural per Anesthesia  - Draw CBC, T&S  - Ancef OCTOR  - To OR for C/S. Case Request is in.   - Notify Staff  - Ultrasound performed, infant in vertex position.   - Post-Partum Hemorrhage risk - low

## 2018-04-06 NOTE — ANESTHESIA PROCEDURE NOTES
CSE    Patient location during procedure: OR  Start time: 4/6/2018 9:45 AM  Timeout: 4/6/2018 9:40 AM  End time: 4/6/2018 9:56 AM  Staffing  Anesthesiologist: LILLIAN CARDOZA  Resident/CRNA: KIRA SRIVASTAVA  Performed: anesthesiologist and resident/CRNA   Preanesthetic Checklist  Completed: patient identified, site marked, surgical consent, pre-op evaluation, timeout performed, IV checked, risks and benefits discussed and monitors and equipment checked  CSE  Patient position: sitting  Prep: ChloraPrep  Patient monitoring: heart rate and continuous pulse ox  Approach: midline  Spinal Needle  Needle type: pencil-tip   Needle gauge: 25 G  Needle length: 5 in  Epidural Needle  Injection technique: TIFFANIE air  Needle type: Tuohy   Needle gauge: 17 G  Needle length: 3.5 in  Needle insertion depth: 6 cm  Location: L4-5  Needle localization: anatomical landmarks  Catheter  Catheter type: springwound  Catheter at skin depth: 11 cm  Assessment  Sensory level: T4   Dermatomal levels determined by pinch or prick  Intrathecal Medications:  Bolus administered: 1.6 mL of 0.75 and with dextrose bupivacaine  administered: primary anesthetic mcg of  : 10 mcg fentanyl, 150 mcg morphine.

## 2018-04-06 NOTE — HPI
Britt Roldan is a 38 y.o.  female with IUP at 39w2d weeks gestation who is admitted for scheduled  Section secondary to prior c/s x2.     This IUP is complicated by GBS+ status, prior c/s x2.  Patient denies contractions, denies vaginal bleeding, denies LOF.   Fetal Movement: normal.

## 2018-04-07 PROBLEM — D62 ACUTE BLOOD LOSS ANEMIA: Status: ACTIVE | Noted: 2018-04-07

## 2018-04-07 LAB
BASOPHILS # BLD AUTO: 0.01 K/UL
BASOPHILS NFR BLD: 0.1 %
DIFFERENTIAL METHOD: ABNORMAL
EOSINOPHIL # BLD AUTO: 0 K/UL
EOSINOPHIL NFR BLD: 0 %
ERYTHROCYTE [DISTWIDTH] IN BLOOD BY AUTOMATED COUNT: 13.3 %
HCT VFR BLD AUTO: 22.8 %
HGB BLD-MCNC: 7.6 G/DL
LYMPHOCYTES # BLD AUTO: 1.3 K/UL
LYMPHOCYTES NFR BLD: 16.8 %
MCH RBC QN AUTO: 30.5 PG
MCHC RBC AUTO-ENTMCNC: 33.3 G/DL
MCV RBC AUTO: 92 FL
MONOCYTES # BLD AUTO: 0.5 K/UL
MONOCYTES NFR BLD: 6.9 %
NEUTROPHILS # BLD AUTO: 6 K/UL
NEUTROPHILS NFR BLD: 76.1 %
PLATELET # BLD AUTO: 153 K/UL
PMV BLD AUTO: 11 FL
RBC # BLD AUTO: 2.49 M/UL
WBC # BLD AUTO: 7.85 K/UL

## 2018-04-07 PROCEDURE — 25000003 PHARM REV CODE 250: Performed by: STUDENT IN AN ORGANIZED HEALTH CARE EDUCATION/TRAINING PROGRAM

## 2018-04-07 PROCEDURE — 11000001 HC ACUTE MED/SURG PRIVATE ROOM

## 2018-04-07 PROCEDURE — 85025 COMPLETE CBC W/AUTO DIFF WBC: CPT

## 2018-04-07 PROCEDURE — 36415 COLL VENOUS BLD VENIPUNCTURE: CPT

## 2018-04-07 PROCEDURE — 63600175 PHARM REV CODE 636 W HCPCS: Performed by: STUDENT IN AN ORGANIZED HEALTH CARE EDUCATION/TRAINING PROGRAM

## 2018-04-07 PROCEDURE — 99231 SBSQ HOSP IP/OBS SF/LOW 25: CPT | Mod: ,,, | Performed by: OBSTETRICS & GYNECOLOGY

## 2018-04-07 RX ORDER — HYDROCODONE BITARTRATE AND ACETAMINOPHEN 5; 325 MG/1; MG/1
1 TABLET ORAL EVERY 4 HOURS PRN
Qty: 20 TABLET | Refills: 0 | Status: SHIPPED | OUTPATIENT
Start: 2018-04-07

## 2018-04-07 RX ORDER — FERROUS SULFATE 325(65) MG
325 TABLET, DELAYED RELEASE (ENTERIC COATED) ORAL DAILY
Status: DISCONTINUED | OUTPATIENT
Start: 2018-04-07 | End: 2018-04-09 | Stop reason: HOSPADM

## 2018-04-07 RX ORDER — FERROUS SULFATE 325(65) MG
325 TABLET, DELAYED RELEASE (ENTERIC COATED) ORAL DAILY
Refills: 0 | COMMUNITY
Start: 2018-04-08

## 2018-04-07 RX ORDER — DOCUSATE SODIUM 100 MG/1
200 CAPSULE, LIQUID FILLED ORAL 2 TIMES DAILY
Refills: 0 | COMMUNITY
Start: 2018-04-07

## 2018-04-07 RX ORDER — IBUPROFEN 600 MG/1
600 TABLET ORAL EVERY 6 HOURS
Qty: 60 TABLET | Refills: 2 | Status: SHIPPED | OUTPATIENT
Start: 2018-04-08

## 2018-04-07 RX ADMIN — IBUPROFEN 600 MG: 600 TABLET ORAL at 11:04

## 2018-04-07 RX ADMIN — HYDROCODONE BITARTRATE AND ACETAMINOPHEN 1 TABLET: 10; 325 TABLET ORAL at 11:04

## 2018-04-07 RX ADMIN — SIMETHICONE CHEW TAB 80 MG 80 MG: 80 TABLET ORAL at 09:04

## 2018-04-07 RX ADMIN — HYDROCODONE BITARTRATE AND ACETAMINOPHEN 1 TABLET: 10; 325 TABLET ORAL at 09:04

## 2018-04-07 RX ADMIN — ACETAMINOPHEN 650 MG: 325 TABLET ORAL at 05:04

## 2018-04-07 RX ADMIN — FERROUS SULFATE TAB EC 325 MG (65 MG FE EQUIVALENT) 325 MG: 325 (65 FE) TABLET DELAYED RESPONSE at 08:04

## 2018-04-07 RX ADMIN — IBUPROFEN 600 MG: 600 TABLET ORAL at 05:04

## 2018-04-07 RX ADMIN — HYDROCODONE BITARTRATE AND ACETAMINOPHEN 1 TABLET: 10; 325 TABLET ORAL at 05:04

## 2018-04-07 RX ADMIN — KETOROLAC TROMETHAMINE 30 MG: 30 INJECTION, SOLUTION INTRAMUSCULAR at 05:04

## 2018-04-07 RX ADMIN — OXYCODONE HYDROCHLORIDE 10 MG: 5 TABLET ORAL at 06:04

## 2018-04-07 RX ADMIN — DOCUSATE SODIUM 200 MG: 100 CAPSULE, LIQUID FILLED ORAL at 08:04

## 2018-04-07 NOTE — LACTATION NOTE
04/07/18 1011   Maternal Infant Assessment   Breast Shape pendulous   Breast Density soft   Areola elastic   Nipple(s) everted   Infant Assessment   Sucking Reflex present   Rooting Reflex present   Swallow Reflex present   LATCH Score   Latch 1-->repeated attempts, holds nipple in mouth, stimulate to suck   Audible Swallowing 1-->a few with stimulation   Type Of Nipple 2-->everted (after stimulation)   Comfort (Breast/Nipple) 2-->soft/nontender   Hold (Positioning) 1-->minimal assist, teach one side: mother does other, staff holds   Score (less than 7 for 2/more consecutive times, consult Lactation Consultant) 7   Maternal Infant Feeding   Maternal Emotional State assist needed   Infant Positioning cross-cradle   Signs of Milk Transfer audible swallow;breasts soften with feeding;infant jaw motion present   Time Spent (min) 0-15 min   Latch Assistance yes   Lactation Referrals   Lactation Consult Breastfeeding assessment   Lactation Interventions   Attachment Promotion breastfeeding assistance provided;face-to-face positioning promoted;skin-to-skin contact encouraged   Breastfeeding Assistance assisted with positioning;infant latch-on verified;infant suck/swallow verified   Maternal Breastfeeding Support lactation counseling provided   Latch Promotion infant moved to breast;positioning assisted   LC asst mother nursing in cross chest. LC asst mother getting a deeper latch.

## 2018-04-07 NOTE — SUBJECTIVE & OBJECTIVE
Hospital course: 2018 - Schedule repeat  delivery, uncomplicated.  2018 - POD # 1 s/p RLTCS. Patient doing well. Starting to meet Post-Op Goals.     Interval History:     She is doing well this morning. She is tolerating a regular diet without nausea or vomiting. She is voiding spontaneously. She is ambulating. She has passed flatus, and has not a BM. Vaginal bleeding is mild. She denies fever or chills. Abdominal pain is mild and controlled with oral medications. She is breastfeeding. She desires circumcision for her male baby: yes.    Objective:     Vital Signs (Most Recent):  Temp: 97.4 °F (36.3 °C) (18 034)  Pulse: 76 (18)  Resp: 17 (18)  BP: 112/62 (18)  SpO2: 97 % (18) Vital Signs (24h Range):  Temp:  [95.8 °F (35.4 °C)-98.6 °F (37 °C)] 97.4 °F (36.3 °C)  Pulse:  [54-76] 76  Resp:  [17-20] 17  SpO2:  [96 %-100 %] 97 %  BP: ()/(49-76) 112/62     Weight: 90.7 kg (200 lb)  Body mass index is 33.28 kg/m².      Intake/Output Summary (Last 24 hours) at 18 0640  Last data filed at 18 0615   Gross per 24 hour   Intake             1000 ml   Output             2525 ml   Net            -1525 ml       Significant Labs:  Lab Results   Component Value Date    GROUPTRH B POS 2018    HEPBSAG Negative 2017    STREPBCULT  2018     STREPTOCOCCUS AGALACTIAE (GROUP B)  Beta-hemolytic streptococci are routinely susceptible to   penicillins,cephalosporins and carbapenems.         Recent Labs  Lab 18  0427   HGB 7.6*   HCT 22.8*       I have personallly reviewed all pertinent lab results from the last 24 hours.    Physical Exam:   Constitutional: She is oriented to person, place, and time. She appears well-developed and well-nourished. No distress.    HENT:   Head: Normocephalic and atraumatic.    Eyes: Conjunctivae are normal.    Neck: Neck supple.    Cardiovascular: Normal rate, regular rhythm and intact distal pulses.      Pulmonary/Chest: Effort normal. No respiratory distress.        Abdominal: Soft. She exhibits abdominal incision (Surgical dressign present with mild strikethrough on left lateral edge. No signs of infection or inflammation. ). She exhibits no distension. There is no tenderness. There is no rebound and no guarding.     Genitourinary:   Genitourinary Comments: Uterus firm and at the umbilicus.           Musculoskeletal: Moves all extremeties.       Neurological: She is alert and oriented to person, place, and time.    Skin: Skin is warm and dry. She is not diaphoretic.    Psychiatric: She has a normal mood and affect. Her behavior is normal. Judgment and thought content normal.

## 2018-04-07 NOTE — ASSESSMENT & PLAN NOTE
- Heme: Pre Delivery h/h 11/33 --> Post Delivery h/h 7.6/22.8  - Fe/Colace  - Patient Asymptomatic

## 2018-04-07 NOTE — ASSESSMENT & PLAN NOTE
Postpartum care:  - Patient doing well. Continue routine management and advances.  - Transition to PO pain meds at 24 hours. Pain well controlled.  - Encourage ambulation.   - Heme: Pre Delivery h/h 11/33 --> Post Delivery h/h 7.6/22.8  - Circumcision - Consents signed and order placed  - Contraception - OCPs  - Lactation - The patient is breast feeding. Lactation nurse following along PRN  - Rh Status - B POS

## 2018-04-07 NOTE — ANESTHESIA POSTPROCEDURE EVALUATION
"Anesthesia Post Evaluation    Patient: Britt Roldan    Procedure(s) Performed: Procedure(s) (LRB):  DELIVERY- SECTION (N/A)    Final Anesthesia Type: CSE  Patient location during evaluation: floor  Patient participation: Yes- Able to Participate  Level of consciousness: awake and alert and oriented  Post-procedure vital signs: reviewed and stable  Pain management: adequate  Airway patency: patent  PONV status at discharge: No PONV  Anesthetic complications: no      Cardiovascular status: blood pressure returned to baseline  Respiratory status: unassisted  Hydration status: euvolemic          Visit Vitals  BP (!) 96/55   Pulse 87   Temp 36.9 °C (98.4 °F) (Oral)   Resp 18   Ht 5' 5" (1.651 m)   Wt 90.7 kg (200 lb)   LMP 2017 (LMP Unknown)   SpO2 97%   Breastfeeding? Unknown   BMI 33.28 kg/m²       Pain/Norris Score: Pain Rating Prior to Med Admin: 7 (2018  6:12 AM)  Pain Rating Post Med Admin: 0 (2018  6:43 AM)      "

## 2018-04-07 NOTE — PROGRESS NOTES
[facility]  Obstetrics  Postpartum Progress Note    Patient Name: Britt Roldan  MRN: [id number]  Admission Date: 2018  Hospital Length of Stay: 1 days  Attending Physician: MARY Patricio MD  Primary Care Provider: TRICE Cotton    Subjective:     Principal Problem:Previous  delivery affecting pregnancy    Hospital course: 2018 - Schedule repeat  delivery, uncomplicated.  2018 - POD # 1 s/p RLTCS. Patient doing well. Starting to meet Post-Op Goals.     Interval History:     She is doing well this morning. She is tolerating a regular diet without nausea or vomiting. She is voiding spontaneously. She is ambulating. She has passed flatus, and has not a BM. Vaginal bleeding is mild. She denies fever or chills. Abdominal pain is mild and controlled with oral medications. She is breastfeeding. She desires circumcision for her male baby: yes.    Objective:     Vital Signs (Most Recent):  Temp: 97.4 °F (36.3 °C) (18 034)  Pulse: 76 (18 034)  Resp: 17 (18)  BP: 112/62 (18)  SpO2: 97 % (18) Vital Signs (24h Range):  Temp:  [95.8 °F (35.4 °C)-98.6 °F (37 °C)] 97.4 °F (36.3 °C)  Pulse:  [54-76] 76  Resp:  [17-20] 17  SpO2:  [96 %-100 %] 97 %  BP: ()/(49-76) 112/62     Weight: 90.7 kg (200 lb)  Body mass index is 33.28 kg/m².      Intake/Output Summary (Last 24 hours) at 18 0640  Last data filed at 18 0615   Gross per 24 hour   Intake             1000 ml   Output             2525 ml   Net            -1525 ml       Significant Labs:  Lab Results   Component Value Date    GROUPTRH B POS 2018    HEPBSAG Negative 2017    STREPBCULT  2018     STREPTOCOCCUS AGALACTIAE (GROUP B)  Beta-hemolytic streptococci are routinely susceptible to   penicillins,cephalosporins and carbapenems.         Recent Labs  Lab 18  0427   HGB 7.6*   HCT 22.8*       I have personallly reviewed all pertinent lab  results from the last 24 hours.    Physical Exam:   Constitutional: She is oriented to person, place, and time. She appears well-developed and well-nourished. No distress.    HENT:   Head: Normocephalic and atraumatic.    Eyes: Conjunctivae are normal.    Neck: Neck supple.    Cardiovascular: Normal rate, regular rhythm and intact distal pulses.     Pulmonary/Chest: Effort normal. No respiratory distress.        Abdominal: Soft. She exhibits abdominal incision (Surgical dressign present with mild strikethrough on left lateral edge. No signs of infection or inflammation. ). She exhibits no distension. There is no tenderness. There is no rebound and no guarding.     Genitourinary:   Genitourinary Comments: Uterus firm and at the umbilicus.           Musculoskeletal: Moves all extremeties.       Neurological: She is alert and oriented to person, place, and time.    Skin: Skin is warm and dry. She is not diaphoretic.    Psychiatric: She has a normal mood and affect. Her behavior is normal. Judgment and thought content normal.       Assessment/Plan:     38 y.o. female  for:    * Previous  delivery affecting pregnancy    - S/P Repeat         Acute blood loss anemia    - Heme: Pre Delivery h/h  --> Post Delivery h/h 7.6/22.8  - Fe/Colace  - Patient Asymptomatic        S/P repeat low transverse     Postpartum care:  - Patient doing well. Continue routine management and advances.  - Transition to PO pain meds at 24 hours. Pain well controlled.  - Encourage ambulation.   - Heme: Pre Delivery h/h  --> Post Delivery h/h 7.6/22.8  - Circumcision - Consents signed and order placed  - Contraception - OCPs  - Lactation - The patient is breast feeding. Lactation nurse following along PRN  - Rh Status - B POS                Disposition: As patient meets milestones, will plan to discharge POD 3-4.    Aly Kimball MD  Obstetrics  [facility]    Doing well, no questions,no  problems.Discussed anemia.  I have reviewed the resident's note, evaluated the patient and agree with the diagnosis and management plan

## 2018-04-07 NOTE — HOSPITAL COURSE
2018 - Schedule repeat  delivery, uncomplicated.  2018 - POD # 1 s/p RLTCS. Patient doing well. Starting to meet Post-Op Goals.   2018 - POD # 2. Meeting all milestone, requesting discharge.

## 2018-04-08 LAB
BILIRUB UR QL STRIP: NEGATIVE
CLARITY UR: CLEAR
COLOR UR: YELLOW
GLUCOSE UR QL STRIP: NEGATIVE
HGB UR QL STRIP: ABNORMAL
KETONES UR QL STRIP: NEGATIVE
LEUKOCYTE ESTERASE UR QL STRIP: NEGATIVE
MICROSCOPIC COMMENT: ABNORMAL
NITRITE UR QL STRIP: NEGATIVE
PH UR STRIP: 6 [PH] (ref 5–8)
PROT UR QL STRIP: NEGATIVE
RBC #/AREA URNS HPF: 22 /HPF (ref 0–4)
SP GR UR STRIP: <=1.005 (ref 1–1.03)
SQUAMOUS #/AREA URNS HPF: 2 /HPF
URN SPEC COLLECT METH UR: ABNORMAL
UROBILINOGEN UR STRIP-ACNC: NEGATIVE EU/DL

## 2018-04-08 PROCEDURE — 25000003 PHARM REV CODE 250: Performed by: STUDENT IN AN ORGANIZED HEALTH CARE EDUCATION/TRAINING PROGRAM

## 2018-04-08 PROCEDURE — 11000001 HC ACUTE MED/SURG PRIVATE ROOM

## 2018-04-08 PROCEDURE — 81000 URINALYSIS NONAUTO W/SCOPE: CPT

## 2018-04-08 PROCEDURE — 87086 URINE CULTURE/COLONY COUNT: CPT

## 2018-04-08 RX ORDER — BISACODYL 10 MG
10 SUPPOSITORY, RECTAL RECTAL ONCE
Status: DISCONTINUED | OUTPATIENT
Start: 2018-04-08 | End: 2018-04-08

## 2018-04-08 RX ADMIN — IBUPROFEN 600 MG: 600 TABLET ORAL at 11:04

## 2018-04-08 RX ADMIN — FERROUS SULFATE TAB EC 325 MG (65 MG FE EQUIVALENT) 325 MG: 325 (65 FE) TABLET DELAYED RESPONSE at 08:04

## 2018-04-08 RX ADMIN — DOCUSATE SODIUM 200 MG: 100 CAPSULE, LIQUID FILLED ORAL at 08:04

## 2018-04-08 RX ADMIN — HYDROCODONE BITARTRATE AND ACETAMINOPHEN 1 TABLET: 5; 325 TABLET ORAL at 08:04

## 2018-04-08 RX ADMIN — IBUPROFEN 600 MG: 600 TABLET ORAL at 08:04

## 2018-04-08 RX ADMIN — IBUPROFEN 600 MG: 600 TABLET ORAL at 05:04

## 2018-04-08 RX ADMIN — MAGNESIUM HYDROXIDE 2400 MG: 400 SUSPENSION ORAL at 01:04

## 2018-04-08 RX ADMIN — IBUPROFEN 600 MG: 600 TABLET ORAL at 12:04

## 2018-04-08 NOTE — PLAN OF CARE
Problem: Patient Care Overview  Goal: Plan of Care Review  Outcome: Ongoing (interventions implemented as appropriate)  Patient is breastfeeding her baby and is following basic education provided.

## 2018-04-08 NOTE — LACTATION NOTE
Patient reports baby is nursing well and declines need of assistance at this time. Reinforced breastfeeding basics. Lactation number written on white board and encouraged mother to call at next feeding to assess latch.

## 2018-04-09 VITALS
HEIGHT: 65 IN | HEART RATE: 97 BPM | DIASTOLIC BLOOD PRESSURE: 69 MMHG | TEMPERATURE: 99 F | WEIGHT: 200 LBS | OXYGEN SATURATION: 99 % | SYSTOLIC BLOOD PRESSURE: 128 MMHG | BODY MASS INDEX: 33.32 KG/M2 | RESPIRATION RATE: 18 BRPM

## 2018-04-09 PROBLEM — D62 ACUTE BLOOD LOSS ANEMIA: Status: ACTIVE | Noted: 2018-04-09

## 2018-04-09 LAB
RUBV IGG SER-ACNC: 29.1 IU/ML
RUBV IGG SER-IMP: REACTIVE

## 2018-04-09 PROCEDURE — 99238 HOSP IP/OBS DSCHRG MGMT 30/<: CPT | Mod: ,,, | Performed by: OBSTETRICS & GYNECOLOGY

## 2018-04-09 PROCEDURE — 25000003 PHARM REV CODE 250: Performed by: STUDENT IN AN ORGANIZED HEALTH CARE EDUCATION/TRAINING PROGRAM

## 2018-04-09 RX ORDER — ACETAMINOPHEN AND CODEINE PHOSPHATE 120; 12 MG/5ML; MG/5ML
1 SOLUTION ORAL DAILY
Qty: 30 TABLET | Refills: 11 | Status: SHIPPED | OUTPATIENT
Start: 2018-04-09 | End: 2019-04-09

## 2018-04-09 RX ADMIN — DOCUSATE SODIUM 200 MG: 100 CAPSULE, LIQUID FILLED ORAL at 09:04

## 2018-04-09 RX ADMIN — IBUPROFEN 600 MG: 600 TABLET ORAL at 05:04

## 2018-04-09 RX ADMIN — FERROUS SULFATE TAB EC 325 MG (65 MG FE EQUIVALENT) 325 MG: 325 (65 FE) TABLET DELAYED RESPONSE at 09:04

## 2018-04-09 NOTE — SUBJECTIVE & OBJECTIVE
Hospital course: 2018 - Schedule repeat  delivery, uncomplicated.  2018 - POD # 1 s/p RLTCS. Patient doing well. Starting to meet Post-Op Goals.   2018 - POD # 2. Meeting all milestone, requesting discharge.      Interval History:     She is doing well this morning. She is tolerating a regular diet without nausea or vomiting. She is voiding spontaneously. She is ambulating. She has passed flatus, and has not a BM. Vaginal bleeding is mild. She denies fever or chills. Abdominal pain is mild and controlled with oral medications. She is breastfeeding. She desires circumcision for her male baby: yes.    Objective:     Vital Signs (Most Recent):  Temp: 98.8 °F (37.1 °C) (18 2335)  Pulse: 94 (18 2335)  Resp: 16 (18 2335)  BP: 116/65 (18 2335)  SpO2: 100 % (18 1630) Vital Signs (24h Range):  Temp:  [98.3 °F (36.8 °C)-99.3 °F (37.4 °C)] 98.8 °F (37.1 °C)  Pulse:  [] 94  Resp:  [16-20] 16  SpO2:  [98 %-100 %] 100 %  BP: (116-137)/(62-79) 116/65     Weight: 90.7 kg (200 lb)  Body mass index is 33.28 kg/m².      Intake/Output Summary (Last 24 hours) at 18 0704  Last data filed at 18 0500   Gross per 24 hour   Intake             1500 ml   Output                0 ml   Net             1500 ml       Significant Labs:  Lab Results   Component Value Date    GROUPTRH B POS 2018    HEPBSAG Negative 2017    STREPBCULT  2018     STREPTOCOCCUS AGALACTIAE (GROUP B)  Beta-hemolytic streptococci are routinely susceptible to   penicillins,cephalosporins and carbapenems.       No results for input(s): HGB, HCT in the last 48 hours.    I have personallly reviewed all pertinent lab results from the last 24 hours.    Physical Exam:   Constitutional: She is oriented to person, place, and time. She appears well-developed and well-nourished. No distress.    HENT:   Head: Normocephalic and atraumatic.    Eyes: Conjunctivae are normal.    Neck: Neck supple.     Cardiovascular: Normal rate, regular rhythm and intact distal pulses.     Pulmonary/Chest: Effort normal. No respiratory distress.        Abdominal: Soft. She exhibits no distension. Abdominal incision: C\D\I. There is no tenderness. There is no rebound and no guarding.     Genitourinary:   Genitourinary Comments: Uterus firm and at the umbilicus.           Musculoskeletal: Moves all extremeties.       Neurological: She is alert and oriented to person, place, and time.    Skin: Skin is warm and dry. She is not diaphoretic.    Psychiatric: She has a normal mood and affect. Her behavior is normal. Judgment and thought content normal.

## 2018-04-09 NOTE — ASSESSMENT & PLAN NOTE
Postpartum care:  - Patient doing well. Continue routine management and advances.  - Transition to PO pain meds at 24 hours. Pain well controlled.  - Encourage ambulation.   - Heme: Pre Delivery h/h 11/33 --> Post Delivery h/h 7.6/22.8  - Contraception - OCPs  - Lactation - The patient is breast feeding. Lactation nurse following along PRN  - Rh Status - B POS

## 2018-04-09 NOTE — PROGRESS NOTES
[facility]  Obstetrics  Postpartum Progress Note    Patient Name: Britt Roldan  MRN: [id number]  Admission Date: 2018  Hospital Length of Stay: 3 days  Attending Physician: MARY Patricio MD  Primary Care Provider: TRICE Cotton    Subjective:     Principal Problem:Previous  delivery affecting pregnancy    Hospital course: 2018 - Schedule repeat  delivery, uncomplicated.  2018 - POD # 1 s/p RLTCS. Patient doing well. Starting to meet Post-Op Goals.   2018 - POD # 2. Meeting all milestone, requesting discharge.      Interval History:     She is doing well this morning. She is tolerating a regular diet without nausea or vomiting. She is voiding spontaneously. She is ambulating. She has passed flatus, and has not a BM. Vaginal bleeding is mild. She denies fever or chills. Abdominal pain is mild and controlled with oral medications. She is breastfeeding. She desires circumcision for her male baby: yes.    Objective:     Vital Signs (Most Recent):  Temp: 98.8 °F (37.1 °C) (18 2335)  Pulse: 94 (18 2335)  Resp: 16 (18 2335)  BP: 116/65 (18 2335)  SpO2: 100 % (18 1630) Vital Signs (24h Range):  Temp:  [98.3 °F (36.8 °C)-99.3 °F (37.4 °C)] 98.8 °F (37.1 °C)  Pulse:  [] 94  Resp:  [16-20] 16  SpO2:  [98 %-100 %] 100 %  BP: (116-137)/(62-79) 116/65     Weight: 90.7 kg (200 lb)  Body mass index is 33.28 kg/m².      Intake/Output Summary (Last 24 hours) at 18 0704  Last data filed at 18 0500   Gross per 24 hour   Intake             1500 ml   Output                0 ml   Net             1500 ml       Significant Labs:  Lab Results   Component Value Date    GROUPTRH B POS 2018    HEPBSAG Negative 2017    STREPBCULT  2018     STREPTOCOCCUS AGALACTIAE (GROUP B)  Beta-hemolytic streptococci are routinely susceptible to   penicillins,cephalosporins and carbapenems.       No results for input(s): HGB, HCT in  the last 48 hours.    I have personallly reviewed all pertinent lab results from the last 24 hours.    Physical Exam:   Constitutional: She is oriented to person, place, and time. She appears well-developed and well-nourished. No distress.    HENT:   Head: Normocephalic and atraumatic.    Eyes: Conjunctivae are normal.    Neck: Neck supple.    Cardiovascular: Normal rate, regular rhythm and intact distal pulses.     Pulmonary/Chest: Effort normal. No respiratory distress.        Abdominal: Soft. She exhibits no distension. Abdominal incision: C\D\I. There is no tenderness. There is no rebound and no guarding.     Genitourinary:   Genitourinary Comments: Uterus firm and at the umbilicus.           Musculoskeletal: Moves all extremeties.       Neurological: She is alert and oriented to person, place, and time.    Skin: Skin is warm and dry. She is not diaphoretic.    Psychiatric: She has a normal mood and affect. Her behavior is normal. Judgment and thought content normal.       Assessment/Plan:     38 y.o. female  for:    * Previous  delivery affecting pregnancy    - S/P Repeat         Acute blood loss anemia    - Heme: Pre Delivery h/h  --> Post Delivery h/h 7.6/22.8  - Fe/Colace  - Patient Asymptomatic        S/P repeat low transverse     Postpartum care:  - Patient doing well. Continue routine management and advances.  - Transition to PO pain meds at 24 hours. Pain well controlled.  - Encourage ambulation.   - Heme: Pre Delivery h/h  --> Post Delivery h/h 7.6/22.8  - Contraception - OCPs  - Lactation - The patient is breast feeding. Lactation nurse following along PRN  - Rh Status - B POS                Disposition: As patient meets milestones, will plan to discharge today.    GURVINDER Macedo MD  Obstetrics  [facility]

## 2018-04-09 NOTE — NURSING
Discharge teaching completed at this time. Pt. Verbalizes understanding. Rx at bedside for dc home, Foot print sheet signed and ID bands verified, Car Seat law signed,. Pt states she will call when her ride arrives and she is ready for transport. No s/s of distress noted

## 2018-04-09 NOTE — PLAN OF CARE
Problem: Patient Care Overview  Goal: Plan of Care Review  Outcome: Ongoing (interventions implemented as appropriate)  Lactation note:  To room to review lactation discharge teaching with patient. Patient states infant nursing well and she has nursed two other children. Declined latch assistance and teaching at this time. Encouraged nursing infant 8 or more times in 24 hours on cue until content. The patient declined being taught how to perform hand expression and will call her insurance/WIC to see if they cover a breast pump. The patient has the lactation phone number to call as needed. Additional resources were placed on her AVS to be given at discharge.

## 2018-04-10 LAB — BACTERIA UR CULT: NO GROWTH

## 2020-04-21 DIAGNOSIS — Z01.84 ANTIBODY RESPONSE EXAMINATION: ICD-10-CM

## 2020-05-21 DIAGNOSIS — Z01.84 ANTIBODY RESPONSE EXAMINATION: ICD-10-CM

## 2020-06-20 DIAGNOSIS — Z01.84 ANTIBODY RESPONSE EXAMINATION: ICD-10-CM

## 2020-07-20 DIAGNOSIS — Z01.84 ANTIBODY RESPONSE EXAMINATION: ICD-10-CM

## 2020-08-19 DIAGNOSIS — Z01.84 ANTIBODY RESPONSE EXAMINATION: ICD-10-CM

## 2020-09-18 DIAGNOSIS — Z01.84 ANTIBODY RESPONSE EXAMINATION: ICD-10-CM

## 2020-10-18 DIAGNOSIS — Z01.84 ANTIBODY RESPONSE EXAMINATION: ICD-10-CM

## 2020-11-17 DIAGNOSIS — Z01.84 ANTIBODY RESPONSE EXAMINATION: ICD-10-CM

## 2021-04-16 ENCOUNTER — PATIENT MESSAGE (OUTPATIENT)
Dept: RESEARCH | Facility: HOSPITAL | Age: 41
End: 2021-04-16

## 2021-10-21 ENCOUNTER — IMMUNIZATION (OUTPATIENT)
Dept: OBSTETRICS AND GYNECOLOGY | Facility: CLINIC | Age: 41
End: 2021-10-21
Payer: MEDICAID

## 2021-10-21 DIAGNOSIS — Z23 NEED FOR VACCINATION: Primary | ICD-10-CM

## 2021-10-21 PROCEDURE — 0001A COVID-19, MRNA, LNP-S, PF, 30 MCG/0.3 ML DOSE VACCINE: CPT | Mod: PBBFAC

## 2021-11-09 ENCOUNTER — IMMUNIZATION (OUTPATIENT)
Dept: OBSTETRICS AND GYNECOLOGY | Facility: CLINIC | Age: 41
End: 2021-11-09
Payer: MEDICAID

## 2021-11-09 DIAGNOSIS — Z23 NEED FOR VACCINATION: Primary | ICD-10-CM

## 2021-11-09 PROCEDURE — 91300 COVID-19, MRNA, LNP-S, PF, 30 MCG/0.3 ML DOSE VACCINE: CPT | Mod: PBBFAC

## 2021-11-09 PROCEDURE — 0002A COVID-19, MRNA, LNP-S, PF, 30 MCG/0.3 ML DOSE VACCINE: CPT | Mod: PBBFAC

## 2024-10-08 ENCOUNTER — TELEPHONE (OUTPATIENT)
Dept: EMERGENCY MEDICINE | Facility: HOSPITAL | Age: 44
End: 2024-10-08
Payer: MEDICAID

## 2024-10-08 RX ORDER — NITROFURANTOIN 25; 75 MG/1; MG/1
100 CAPSULE ORAL 2 TIMES DAILY
Qty: 10 CAPSULE | Refills: 0 | Status: SHIPPED | OUTPATIENT
Start: 2024-10-08 | End: 2024-10-13

## 2024-10-08 RX ORDER — PHENAZOPYRIDINE HYDROCHLORIDE 200 MG/1
200 TABLET, FILM COATED ORAL
Qty: 6 TABLET | Refills: 0 | Status: SHIPPED | OUTPATIENT
Start: 2024-10-08 | End: 2024-10-10

## 2024-10-29 ENCOUNTER — OFFICE VISIT (OUTPATIENT)
Dept: OPTOMETRY | Facility: CLINIC | Age: 44
End: 2024-10-29

## 2024-10-29 DIAGNOSIS — Z01.00 ROUTINE EYE EXAM: Primary | ICD-10-CM

## 2024-10-29 DIAGNOSIS — H52.7 REFRACTIVE ERROR: ICD-10-CM

## 2024-10-29 PROCEDURE — 99212 OFFICE O/P EST SF 10 MIN: CPT | Mod: PBBFAC,PO | Performed by: OPTOMETRIST

## 2024-10-29 PROCEDURE — 92004 COMPRE OPH EXAM NEW PT 1/>: CPT | Mod: S$PBB,,, | Performed by: OPTOMETRIST

## 2024-10-29 PROCEDURE — 99999 PR PBB SHADOW E&M-EST. PATIENT-LVL II: CPT | Mod: PBBFAC,,, | Performed by: OPTOMETRIST

## 2024-10-29 PROCEDURE — 92015 DETERMINE REFRACTIVE STATE: CPT | Mod: ,,, | Performed by: OPTOMETRIST
